# Patient Record
Sex: FEMALE | Race: WHITE | Employment: FULL TIME | URBAN - METROPOLITAN AREA
[De-identification: names, ages, dates, MRNs, and addresses within clinical notes are randomized per-mention and may not be internally consistent; named-entity substitution may affect disease eponyms.]

---

## 2017-05-26 ENCOUNTER — OFFICE VISIT (OUTPATIENT)
Dept: FAMILY MEDICINE CLINIC | Facility: CLINIC | Age: 37
End: 2017-05-26

## 2017-05-26 VITALS
TEMPERATURE: 98 F | WEIGHT: 157 LBS | SYSTOLIC BLOOD PRESSURE: 102 MMHG | HEART RATE: 86 BPM | DIASTOLIC BLOOD PRESSURE: 72 MMHG | RESPIRATION RATE: 16 BRPM | BODY MASS INDEX: 24.64 KG/M2 | HEIGHT: 67 IN

## 2017-05-26 DIAGNOSIS — Z15.01 BRCA2 POSITIVE: ICD-10-CM

## 2017-05-26 DIAGNOSIS — Z15.09 BRCA2 POSITIVE: ICD-10-CM

## 2017-05-26 DIAGNOSIS — Z13.89 SCREENING FOR GENITOURINARY CONDITION: ICD-10-CM

## 2017-05-26 DIAGNOSIS — Z00.00 PHYSICAL EXAM, ANNUAL: Primary | ICD-10-CM

## 2017-05-26 DIAGNOSIS — E55.9 VITAMIN D DEFICIENCY: ICD-10-CM

## 2017-05-26 DIAGNOSIS — Z01.419 CERVICAL SMEAR, AS PART OF ROUTINE GYNECOLOGICAL EXAMINATION: ICD-10-CM

## 2017-05-26 DIAGNOSIS — Z00.00 LABORATORY EXAMINATION ORDERED AS PART OF A ROUTINE GENERAL MEDICAL EXAMINATION: ICD-10-CM

## 2017-05-26 PROCEDURE — 99395 PREV VISIT EST AGE 18-39: CPT | Performed by: FAMILY MEDICINE

## 2017-05-26 NOTE — PATIENT INSTRUCTIONS
Call [phone] to schedule US thyroid and Mammogram.  Schedule appointment with Dr. Partida Nurse for breast clinic. Schedule appointment with Dr. Desi Scales OB/GYN for your Pap smear and further evaluation recommendations as needed.   Return for fasting bloo

## 2017-05-26 NOTE — PROGRESS NOTES
HPI:   Loyda Kumar is a 43year old female who presents for a complete physical exam. Symptoms: denies discharge, itching, burning or dysuria. Patient complains of feeling that she got older for the past 1 year. Her energy is lower.   She thinks that Problem Relation Age of Onset   • Breast Cancer Mother 32   • Cancer Mother      breast ca,    • [place] Man Mother    • varicose veins[other] [OTHER] Mother    • Ovarian Cancer Maternal Grandmother 48      of ovarian cancer.    • Ovarian Can tenderness  : patient referred to see GYN  RECTAL deferred.   MUSCULOSKELETAL: back is not tender,FROM of the back  EXTREMITIES: no cyanosis, clubbing or edema  NEURO: Oriented times three,cranial nerves are intact,motor and sensory are grossly intact

## 2017-05-27 ENCOUNTER — LAB ENCOUNTER (OUTPATIENT)
Dept: LAB | Facility: HOSPITAL | Age: 37
End: 2017-05-27
Attending: FAMILY MEDICINE
Payer: COMMERCIAL

## 2017-05-27 DIAGNOSIS — Z00.00 LABORATORY EXAMINATION ORDERED AS PART OF A ROUTINE GENERAL MEDICAL EXAMINATION: ICD-10-CM

## 2017-05-27 DIAGNOSIS — E55.9 VITAMIN D DEFICIENCY: ICD-10-CM

## 2017-05-27 DIAGNOSIS — Z13.89 SCREENING FOR GENITOURINARY CONDITION: ICD-10-CM

## 2017-05-27 PROCEDURE — 82306 VITAMIN D 25 HYDROXY: CPT

## 2017-05-27 PROCEDURE — 84443 ASSAY THYROID STIM HORMONE: CPT

## 2017-05-27 PROCEDURE — 81003 URINALYSIS AUTO W/O SCOPE: CPT

## 2017-05-27 PROCEDURE — 36415 COLL VENOUS BLD VENIPUNCTURE: CPT

## 2017-05-27 PROCEDURE — 82670 ASSAY OF TOTAL ESTRADIOL: CPT

## 2017-05-27 PROCEDURE — 80061 LIPID PANEL: CPT

## 2017-05-27 PROCEDURE — 80053 COMPREHEN METABOLIC PANEL: CPT

## 2017-05-27 PROCEDURE — 85025 COMPLETE CBC W/AUTO DIFF WBC: CPT

## 2017-05-27 PROCEDURE — 83001 ASSAY OF GONADOTROPIN (FSH): CPT

## 2017-05-30 DIAGNOSIS — R79.89 LOW VITAMIN D LEVEL: Primary | ICD-10-CM

## 2017-05-30 RX ORDER — ERGOCALCIFEROL 1.25 MG/1
50000 CAPSULE ORAL WEEKLY
Qty: 12 CAPSULE | Refills: 0 | Status: SHIPPED | OUTPATIENT
Start: 2017-05-30 | End: 2017-11-15

## 2017-06-02 ENCOUNTER — HOSPITAL ENCOUNTER (OUTPATIENT)
Dept: ULTRASOUND IMAGING | Facility: HOSPITAL | Age: 37
Discharge: HOME | End: 2017-06-02
Attending: OTOLARYNGOLOGY
Payer: COMMERCIAL

## 2017-06-02 DIAGNOSIS — E04.2 MULTINODULAR GOITER: ICD-10-CM

## 2017-06-02 PROCEDURE — 76536 US EXAM OF HEAD AND NECK: CPT | Performed by: OTOLARYNGOLOGY

## 2017-06-05 NOTE — PROGRESS NOTES
Quick Note:    Spoke to patient regarding results and recommendations, scheduled f/u 7/26, patient needs late appointment.  ______

## 2017-06-05 NOTE — PROGRESS NOTES
Quick Note:    Please inform us thyroid showing no significant changes to multiple thyroid nodules present new left 9mm nodule is present recommend patient to follow up with Dr Lakesha Thomson to further evaluate and discuss  ______

## 2017-06-06 ENCOUNTER — TELEPHONE (OUTPATIENT)
Dept: HEMATOLOGY/ONCOLOGY | Facility: HOSPITAL | Age: 37
End: 2017-06-06

## 2017-06-06 ENCOUNTER — LAB ENCOUNTER (OUTPATIENT)
Dept: DERMATOLOGY CLINIC | Age: 37
End: 2017-06-06
Attending: DERMATOLOGY
Payer: COMMERCIAL

## 2017-06-06 DIAGNOSIS — D22.9 IRRITATED NEVUS: Primary | ICD-10-CM

## 2017-06-06 PROCEDURE — 88305 TISSUE EXAM BY PATHOLOGIST: CPT

## 2017-06-14 DIAGNOSIS — D22.9 COMPOUND NEVUS: Primary | ICD-10-CM

## 2017-06-19 ENCOUNTER — OFFICE VISIT (OUTPATIENT)
Dept: OBGYN CLINIC | Facility: CLINIC | Age: 37
End: 2017-06-19

## 2017-06-19 VITALS
SYSTOLIC BLOOD PRESSURE: 102 MMHG | HEART RATE: 88 BPM | WEIGHT: 152 LBS | DIASTOLIC BLOOD PRESSURE: 58 MMHG | BODY MASS INDEX: 23.86 KG/M2 | HEIGHT: 67 IN

## 2017-06-19 DIAGNOSIS — N76.0 VAGINITIS AND VULVOVAGINITIS: ICD-10-CM

## 2017-06-19 DIAGNOSIS — Z15.09 BRCA2 POSITIVE: ICD-10-CM

## 2017-06-19 DIAGNOSIS — Z01.419 ENCOUNTER FOR WELL WOMAN EXAM WITH ROUTINE GYNECOLOGICAL EXAM: Primary | ICD-10-CM

## 2017-06-19 DIAGNOSIS — Z15.01 BRCA2 POSITIVE: ICD-10-CM

## 2017-06-19 PROCEDURE — 87624 HPV HI-RISK TYP POOLED RSLT: CPT | Performed by: OBSTETRICS & GYNECOLOGY

## 2017-06-19 PROCEDURE — 88175 CYTOPATH C/V AUTO FLUID REDO: CPT | Performed by: OBSTETRICS & GYNECOLOGY

## 2017-06-19 PROCEDURE — 87591 N.GONORRHOEAE DNA AMP PROB: CPT | Performed by: OBSTETRICS & GYNECOLOGY

## 2017-06-19 PROCEDURE — 99385 PREV VISIT NEW AGE 18-39: CPT | Performed by: OBSTETRICS & GYNECOLOGY

## 2017-06-19 PROCEDURE — 87491 CHLMYD TRACH DNA AMP PROBE: CPT | Performed by: OBSTETRICS & GYNECOLOGY

## 2017-06-19 NOTE — PROGRESS NOTES
Nina Joseph is a 42year old female [id number] Patient's last menstrual period was 06/06/2017 (approximate). Patient presents with:  Wellness Visit  . Patient has no complaints.    BRCA 2 +, refusing mastectomy and oophorectomy, had RSO in 2015 due to [facility] cups of coffee a day    Exercise Yes    Comment: 5 d/wk    Seat Belt Yes    Self-Exams Yes     Social History Narrative       FAMILY HISTORY:  Family History   Problem Relation Age of Onset   • Breast Cancer Mother 32   • Cancer Mother      breast ca,    • person and situation.  Appropriate mood and affect    Pelvic Exam:  External Genitalia: normal appearance, hair distribution, and no lesions  Urethral Meatus:  normal in size, location, without lesions and prolapse  Bladder:  No fullness, masses or tenderne

## 2017-06-26 ENCOUNTER — OFFICE VISIT (OUTPATIENT)
Dept: HEMATOLOGY/ONCOLOGY | Facility: HOSPITAL | Age: 37
End: 2017-06-26
Attending: SPECIALIST
Payer: COMMERCIAL

## 2017-06-26 VITALS
DIASTOLIC BLOOD PRESSURE: 76 MMHG | SYSTOLIC BLOOD PRESSURE: 124 MMHG | BODY MASS INDEX: 24 KG/M2 | TEMPERATURE: 99 F | WEIGHT: 155.5 LBS | HEART RATE: 80 BPM | RESPIRATION RATE: 16 BRPM

## 2017-06-26 DIAGNOSIS — Z15.01 BRCA2 GENE MUTATION POSITIVE: Primary | ICD-10-CM

## 2017-06-26 DIAGNOSIS — Z12.39 BREAST CANCER SCREENING, HIGH RISK PATIENT: ICD-10-CM

## 2017-06-26 DIAGNOSIS — Z91.89 AT HIGH RISK FOR BREAST CANCER: ICD-10-CM

## 2017-06-26 DIAGNOSIS — Z91.89 HIGH RISK OF OVARIAN CANCER: ICD-10-CM

## 2017-06-26 DIAGNOSIS — Z15.09 BRCA2 GENE MUTATION POSITIVE: Primary | ICD-10-CM

## 2017-06-26 PROCEDURE — 99244 OFF/OP CNSLTJ NEW/EST MOD 40: CPT | Performed by: SPECIALIST

## 2017-06-26 NOTE — PROGRESS NOTES
Referring Provider: Dr. Mahendra Avalos     Additional Providers: Dr. Omega Dawson    Reason for Referral:  Ms. Isamar Diamond was referred for genetic counseling because she has a BRCA2 mutation and is transferring care due to insurance changes.  Ms. at age 60. She has had genetic testing and also carries the BRCA2 gene mutation, c.7879A>T ([id number]). Ms. Sudhir Gutiérrez mother has one sister; that sister was recently diagnosed with ovarian cancer at age 61.   She also has the BRCA2 gene mutation, c.78 testing. Ms. Jasbir Andrade son and daughter each have a 50% chance of having the BRCA2 mutation. We discussed that men with BRCA2 mutations do not have the same cancer risks as seen in women.    Screening for breast cancer in female BRCA2 mutation britton

## 2017-06-26 NOTE — PROGRESS NOTES
Patient is here today for Genetic Consult with Erik Capps. Patient denies pain. Medication list and medical history were reviewed and updated.     Education Record    Learner:  Patient    Disease / Diagnosis: Genetic Consult - patient is [facility]

## 2017-07-09 NOTE — PROGRESS NOTES
[facility] Report of Consultation      Patient Name: Vero Onofre   YOB: 1980  Medical Record Number: [id number]  Consulting Physician: Harvey Richard M.D. Referring Physician: Vi Acuna M.D.     Date of Consultati tobacco use but quit 1998; rare alcohol use. Current Medications   No outpatient prescriptions have been marked as taking for the 6/26/17 encounter (Office Visit) with Brandin Dang MD.     Allergies   Ms. Silver has No Known Allergies. cancer, and up to a 7% lifetime risk of male breast cancer. The risk of melanoma may also be increased.       Ms. Silver is not planning additional children.   Other at risk family members are aware of the mutation and/or have undergone genetic testing salpingo-oophorectomy, transvaginal ultrasound without or without serum CA-125 beginning at age 32-31 or 5-10 years before the earliest age of first diagnosis of ovarian cancer in the family can be considered.  However, data have not shown these screening m

## 2017-07-14 ENCOUNTER — HOSPITAL ENCOUNTER (OUTPATIENT)
Dept: ULTRASOUND IMAGING | Facility: HOSPITAL | Age: 37
Discharge: HOME | End: 2017-07-14
Attending: OBSTETRICS & GYNECOLOGY
Payer: COMMERCIAL

## 2017-07-14 ENCOUNTER — HOSPITAL ENCOUNTER (OUTPATIENT)
Dept: MAMMOGRAPHY | Facility: HOSPITAL | Age: 37
Discharge: HOME | End: 2017-07-14
Attending: OBSTETRICS & GYNECOLOGY
Payer: COMMERCIAL

## 2017-07-14 DIAGNOSIS — Z15.01 BRCA2 POSITIVE: ICD-10-CM

## 2017-07-14 DIAGNOSIS — Z15.09 BRCA2 POSITIVE: ICD-10-CM

## 2017-07-14 PROCEDURE — 77066 DX MAMMO INCL CAD BI: CPT | Performed by: OBSTETRICS & GYNECOLOGY

## 2017-07-14 PROCEDURE — 76830 TRANSVAGINAL US NON-OB: CPT | Performed by: OBSTETRICS & GYNECOLOGY

## 2017-07-14 PROCEDURE — 77062 BREAST TOMOSYNTHESIS BI: CPT | Performed by: OBSTETRICS & GYNECOLOGY

## 2017-07-18 ENCOUNTER — LAB ENCOUNTER (OUTPATIENT)
Dept: LAB | Facility: HOSPITAL | Age: 37
End: 2017-07-18
Attending: DERMATOLOGY
Payer: COMMERCIAL

## 2017-07-18 DIAGNOSIS — D23.9 DERMATOFIBROMA: Primary | ICD-10-CM

## 2017-07-18 PROCEDURE — 88305 TISSUE EXAM BY PATHOLOGIST: CPT

## 2017-07-19 DIAGNOSIS — N83.209 OVARIAN CYST: Primary | ICD-10-CM

## 2017-10-27 ENCOUNTER — HOSPITAL ENCOUNTER (OUTPATIENT)
Dept: ULTRASOUND IMAGING | Facility: HOSPITAL | Age: 37
Discharge: HOME | End: 2017-10-27
Attending: OBSTETRICS & GYNECOLOGY
Payer: COMMERCIAL

## 2017-10-27 DIAGNOSIS — N83.209 OVARIAN CYST: ICD-10-CM

## 2017-10-27 PROCEDURE — 76856 US EXAM PELVIC COMPLETE: CPT | Performed by: OBSTETRICS & GYNECOLOGY

## 2017-10-27 PROCEDURE — 76830 TRANSVAGINAL US NON-OB: CPT | Performed by: OBSTETRICS & GYNECOLOGY

## 2017-10-30 DIAGNOSIS — D25.9 UTERINE LEIOMYOMA, UNSPECIFIED LOCATION: ICD-10-CM

## 2017-10-30 DIAGNOSIS — N83.299 COMPLEX OVARIAN CYST: Primary | ICD-10-CM

## 2017-11-14 ENCOUNTER — LAB ENCOUNTER (OUTPATIENT)
Dept: LAB | Facility: HOSPITAL | Age: 37
End: 2017-11-14
Attending: OBSTETRICS & GYNECOLOGY
Payer: COMMERCIAL

## 2017-11-14 DIAGNOSIS — N83.299 COMPLEX OVARIAN CYST: ICD-10-CM

## 2017-11-14 DIAGNOSIS — R79.89 LOW VITAMIN D LEVEL: ICD-10-CM

## 2017-11-14 PROCEDURE — 86304 IMMUNOASSAY TUMOR CA 125: CPT

## 2017-11-14 PROCEDURE — 36415 COLL VENOUS BLD VENIPUNCTURE: CPT

## 2017-11-14 PROCEDURE — 82306 VITAMIN D 25 HYDROXY: CPT

## 2017-11-15 DIAGNOSIS — E55.9 VITAMIN D DEFICIENCY: Primary | ICD-10-CM

## 2017-11-15 RX ORDER — ERGOCALCIFEROL 1.25 MG/1
CAPSULE ORAL
Qty: 12 CAPSULE | Refills: 0 | Status: SHIPPED | OUTPATIENT
Start: 2017-11-15 | End: 2018-07-06 | Stop reason: ALTCHOICE

## 2018-01-12 ENCOUNTER — HOSPITAL ENCOUNTER (OUTPATIENT)
Dept: ULTRASOUND IMAGING | Facility: HOSPITAL | Age: 38
Discharge: HOME | End: 2018-01-12
Attending: OBSTETRICS & GYNECOLOGY
Payer: COMMERCIAL

## 2018-01-12 DIAGNOSIS — N83.299 COMPLEX OVARIAN CYST: ICD-10-CM

## 2018-01-12 PROCEDURE — 76830 TRANSVAGINAL US NON-OB: CPT | Performed by: OBSTETRICS & GYNECOLOGY

## 2018-01-12 PROCEDURE — 93975 VASCULAR STUDY: CPT | Performed by: OBSTETRICS & GYNECOLOGY

## 2018-01-12 PROCEDURE — 76856 US EXAM PELVIC COMPLETE: CPT | Performed by: OBSTETRICS & GYNECOLOGY

## 2018-01-24 ENCOUNTER — HOSPITAL ENCOUNTER (OUTPATIENT)
Dept: MRI IMAGING | Facility: HOSPITAL | Age: 38
Discharge: HOME | End: 2018-01-24
Attending: SPECIALIST
Payer: COMMERCIAL

## 2018-01-24 DIAGNOSIS — Z15.01 BRCA2 GENE MUTATION POSITIVE: ICD-10-CM

## 2018-01-24 DIAGNOSIS — Z12.39 BREAST CANCER SCREENING, HIGH RISK PATIENT: ICD-10-CM

## 2018-01-24 DIAGNOSIS — Z91.89 AT HIGH RISK FOR BREAST CANCER: ICD-10-CM

## 2018-01-24 DIAGNOSIS — Z91.89 HIGH RISK OF OVARIAN CANCER: ICD-10-CM

## 2018-01-24 DIAGNOSIS — Z15.09 BRCA2 GENE MUTATION POSITIVE: ICD-10-CM

## 2018-01-24 PROCEDURE — 77059 MRI BREAST (W+WO) W/CAD BILAT (CPT=77059/0159T): CPT | Performed by: SPECIALIST

## 2018-01-24 PROCEDURE — 0159T MRI BREAST (W+WO) W/CAD BILAT (CPT=77059/0159T): CPT | Performed by: SPECIALIST

## 2018-01-24 PROCEDURE — A9575 INJ GADOTERATE MEGLUMI 0.1ML: HCPCS | Performed by: SPECIALIST

## 2018-01-26 ENCOUNTER — OFFICE VISIT (OUTPATIENT)
Dept: HEMATOLOGY/ONCOLOGY | Facility: HOSPITAL | Age: 38
End: 2018-01-26
Attending: SPECIALIST
Payer: COMMERCIAL

## 2018-01-26 VITALS
WEIGHT: 153 LBS | OXYGEN SATURATION: 98 % | BODY MASS INDEX: 24.01 KG/M2 | SYSTOLIC BLOOD PRESSURE: 117 MMHG | TEMPERATURE: 98 F | HEART RATE: 91 BPM | HEIGHT: 67 IN | RESPIRATION RATE: 18 BRPM | DIASTOLIC BLOOD PRESSURE: 75 MMHG

## 2018-01-26 DIAGNOSIS — Z91.89 HIGH RISK OF OVARIAN CANCER: ICD-10-CM

## 2018-01-26 DIAGNOSIS — Z12.39 BREAST CANCER SCREENING, HIGH RISK PATIENT: ICD-10-CM

## 2018-01-26 DIAGNOSIS — Z91.89 AT HIGH RISK FOR BREAST CANCER: ICD-10-CM

## 2018-01-26 DIAGNOSIS — Z15.09 BRCA2 POSITIVE: Primary | ICD-10-CM

## 2018-01-26 DIAGNOSIS — Z15.01 BRCA2 GENE MUTATION POSITIVE: ICD-10-CM

## 2018-01-26 DIAGNOSIS — Z15.09 BRCA2 GENE MUTATION POSITIVE: ICD-10-CM

## 2018-01-26 DIAGNOSIS — Z15.01 BRCA2 POSITIVE: Primary | ICD-10-CM

## 2018-01-26 DIAGNOSIS — R92.8 ABNORMAL MRI, BREAST: ICD-10-CM

## 2018-01-26 PROCEDURE — 99214 OFFICE O/P EST MOD 30 MIN: CPT | Performed by: SPECIALIST

## 2018-01-26 NOTE — PROGRESS NOTES
Patient is here today for follow up with Dr. Mayra Ngo. Patient denies pain. Medication list and medical history were reviewed and updated.     Education Record    Learner:  Patient    Disease / Diagnosis: BRCA2    Barriers / Limitations:  None   Co

## 2018-01-30 NOTE — PROGRESS NOTES
[facility] Progress Note      Patient Name: Floyd Hinojosa   YOB: 1980  Medical Record Number: [id number]  Attending Physician: Catherine Jarquin M.D.      Date of Visit: 1/26/2018       Chief Complaint  Deleterious BRCA2 mutation Ms. Stacey Vazquez is not in contact with her father. Her paternal grandmother has late onset lung cancer. There is no known family history of pancreatic cancer or melanoma. Please see the pedigree for additional family history information.     Social Hist non-distended; no masses,no fluid wave; normoactive bowel sounds; no hepatosplenomegaly. Extremities          No lower extremity edema. Integumentary      Skin is warm and dry.   Neurologic           Alert and oriented x 3; motor and sensory grossly intac provider every 6 months. 2. Annual mammography and annual MRI are advised such that breast imaging is performed every 6 months. After age 76, management should be considered on an individual basis. 3. Consider risk-reducing prophylactic mastectomy.   4. Electronically signed by:    Renato Turcios M.D.   [facility]  Director, Bone and Soft Tissue Sarcoma Program  Section of Hematology/Oncology, [address] [facility]

## 2018-02-16 ENCOUNTER — TELEPHONE (OUTPATIENT)
Dept: HEMATOLOGY/ONCOLOGY | Facility: HOSPITAL | Age: 38
End: 2018-02-16

## 2018-02-16 ENCOUNTER — HOSPITAL ENCOUNTER (OUTPATIENT)
Dept: MAMMOGRAPHY | Facility: HOSPITAL | Age: 38
Discharge: HOME | End: 2018-02-16
Attending: SPECIALIST
Payer: COMMERCIAL

## 2018-02-16 DIAGNOSIS — Z15.01 BRCA2 POSITIVE: ICD-10-CM

## 2018-02-16 DIAGNOSIS — Z15.09 BRCA2 POSITIVE: ICD-10-CM

## 2018-02-16 DIAGNOSIS — R92.8 ABNORMAL MRI, BREAST: ICD-10-CM

## 2018-02-16 DIAGNOSIS — Z91.89 AT HIGH RISK FOR BREAST CANCER: ICD-10-CM

## 2018-02-16 DIAGNOSIS — N63.10 BREAST MASS, RIGHT: Primary | ICD-10-CM

## 2018-02-16 DIAGNOSIS — Z12.39 BREAST CANCER SCREENING, HIGH RISK PATIENT: ICD-10-CM

## 2018-02-16 PROCEDURE — 76642 ULTRASOUND BREAST LIMITED: CPT | Performed by: SPECIALIST

## 2018-02-21 ENCOUNTER — TELEPHONE (OUTPATIENT)
Dept: MAMMOGRAPHY | Facility: HOSPITAL | Age: 38
End: 2018-02-21

## 2018-02-21 NOTE — TELEPHONE ENCOUNTER
Spoke with pt about breast biopsy recommendation. She has had \"3 done in the past\". She did not have any questions about the procedure.   Blood thinners reviewed with [place] along with what to do on the day of the biopsy.(take Tylenol, bring sports bra, ea

## 2018-02-28 ENCOUNTER — HOSPITAL ENCOUNTER (OUTPATIENT)
Dept: MAMMOGRAPHY | Facility: HOSPITAL | Age: 38
Discharge: HOME | End: 2018-02-28
Attending: CLINICAL NURSE SPECIALIST
Payer: COMMERCIAL

## 2018-02-28 DIAGNOSIS — N63.10 BREAST MASS, RIGHT: ICD-10-CM

## 2018-02-28 PROCEDURE — 77065 DX MAMMO INCL CAD UNI: CPT | Performed by: CLINICAL NURSE SPECIALIST

## 2018-02-28 PROCEDURE — 19083 BX BREAST 1ST LESION US IMAG: CPT | Performed by: CLINICAL NURSE SPECIALIST

## 2018-02-28 PROCEDURE — 88305 TISSUE EXAM BY PATHOLOGIST: CPT | Performed by: CLINICAL NURSE SPECIALIST

## 2018-03-05 ENCOUNTER — TELEPHONE (OUTPATIENT)
Dept: HEMATOLOGY/ONCOLOGY | Facility: HOSPITAL | Age: 38
End: 2018-03-05

## 2018-03-06 ENCOUNTER — TELEPHONE (OUTPATIENT)
Dept: MAMMOGRAPHY | Facility: HOSPITAL | Age: 38
End: 2018-03-06

## 2018-03-06 NOTE — TELEPHONE ENCOUNTER
Telephoned [employer] and name,  verified with pt. Notified [employer] of benign right US breast biopsy result. [employer] reports biopsy site is healing well. Hematoma management discussed.  Radiologist recommends next mammogram is du

## 2018-05-09 ENCOUNTER — TELEPHONE (OUTPATIENT)
Dept: FAMILY MEDICINE CLINIC | Facility: CLINIC | Age: 38
End: 2018-05-09

## 2018-05-09 DIAGNOSIS — D22.9 COMPOUND NEVUS: Primary | ICD-10-CM

## 2018-05-09 NOTE — TELEPHONE ENCOUNTER
Referral for Dermatology (INTERNAL)  1980    . Reason for the order/referral:Referral   PCP: Dr. Marcelo Rodriguez   Refer to Provider Dr. Keegan Cutler   Specialty: Dermatology   Patient Insurance: Payor: Juliet Lee / Plan: Cecy Kirby / Guy Cordoba

## 2018-06-27 ENCOUNTER — TELEPHONE (OUTPATIENT)
Dept: FAMILY MEDICINE CLINIC | Facility: CLINIC | Age: 38
End: 2018-06-27

## 2018-06-27 DIAGNOSIS — E04.2 NONTOXIC MULTINODULAR GOITER: Primary | ICD-10-CM

## 2018-06-27 NOTE — TELEPHONE ENCOUNTER
REFERRAL FOR ENT Dr. Idris Cao (INTERNAL)  1980    Last seen 2017     Reason for the order/referral:Thyroid follow up   PCP: Eliane Colon   Refer to Provider: Idris Cao   Specialty: ENT   Patient Insurance: Payor: Jeimy Pritchett / Plan: Paramjit Lai

## 2018-07-06 ENCOUNTER — OFFICE VISIT (OUTPATIENT)
Dept: FAMILY MEDICINE CLINIC | Facility: CLINIC | Age: 38
End: 2018-07-06

## 2018-07-06 VITALS
HEIGHT: 67 IN | SYSTOLIC BLOOD PRESSURE: 104 MMHG | OXYGEN SATURATION: 98 % | WEIGHT: 161.25 LBS | HEART RATE: 98 BPM | BODY MASS INDEX: 25.31 KG/M2 | DIASTOLIC BLOOD PRESSURE: 62 MMHG

## 2018-07-06 DIAGNOSIS — E04.2 MULTIPLE THYROID NODULES: ICD-10-CM

## 2018-07-06 DIAGNOSIS — R53.83 FATIGUE, UNSPECIFIED TYPE: ICD-10-CM

## 2018-07-06 DIAGNOSIS — E55.9 VITAMIN D DEFICIENCY: ICD-10-CM

## 2018-07-06 DIAGNOSIS — Z00.00 PHYSICAL EXAM, ANNUAL: Primary | ICD-10-CM

## 2018-07-06 PROCEDURE — 99396 PREV VISIT EST AGE 40-64: CPT | Performed by: FAMILY MEDICINE

## 2018-07-06 NOTE — PROGRESS NOTES
HPI:   Nina Joseph is a 34year old female who presents for a complete physical exam. Symptoms: denies discharge, itching, burning or dysuria. Her energy is lower. She thinks that this is related to her hormones. She is only one ovary left.   Stat Disp:  Rfl:    cholecalciferol 1000 units Oral Cap Take one capsule by mouth once a day on all days not taking prescription strength vitamin d.  Disp:  Rfl: 0      Past Medical History:   Diagnosis Date   • BRCA2 positive 2014   • Breast tumor right    11/2 discharge or itching,  MUSCULOSKELETAL: denies back pain  NEURO: denies headaches  PSYCHE: denies depression or anxiety  HEMATOLOGIC: denies hx of anemia  ENDOCRINE: denies thyroid history  ALL/ASTHMA: denies hx of allergy or asthma    EXAM:   /62 (B CMP, and CBC. Pt will be at 46 referred for screening colonoscopy. Pt' s weight is Body mass index is 25.26 kg/m². , recommended low carb diet and aerobic exercise 30 minutes three times weekly.   The patient indicates understanding of these issues and agree

## 2018-07-12 ENCOUNTER — HOSPITAL ENCOUNTER (OUTPATIENT)
Dept: MAMMOGRAPHY | Facility: HOSPITAL | Age: 38
Discharge: HOME | End: 2018-07-12
Attending: SPECIALIST
Payer: COMMERCIAL

## 2018-07-12 ENCOUNTER — LAB ENCOUNTER (OUTPATIENT)
Dept: LAB | Facility: HOSPITAL | Age: 38
End: 2018-07-12
Attending: FAMILY MEDICINE
Payer: COMMERCIAL

## 2018-07-12 ENCOUNTER — HOSPITAL ENCOUNTER (OUTPATIENT)
Dept: ULTRASOUND IMAGING | Facility: HOSPITAL | Age: 38
Discharge: HOME | End: 2018-07-12
Attending: FAMILY MEDICINE
Payer: COMMERCIAL

## 2018-07-12 DIAGNOSIS — Z15.09 BRCA2 POSITIVE: ICD-10-CM

## 2018-07-12 DIAGNOSIS — E55.9 VITAMIN D DEFICIENCY: Primary | ICD-10-CM

## 2018-07-12 DIAGNOSIS — Z12.39 BREAST CANCER SCREENING, HIGH RISK PATIENT: ICD-10-CM

## 2018-07-12 DIAGNOSIS — E04.2 MULTIPLE THYROID NODULES: ICD-10-CM

## 2018-07-12 DIAGNOSIS — E55.9 VITAMIN D DEFICIENCY: ICD-10-CM

## 2018-07-12 DIAGNOSIS — Z00.00 PHYSICAL EXAM, ANNUAL: ICD-10-CM

## 2018-07-12 DIAGNOSIS — Z91.89 AT HIGH RISK FOR BREAST CANCER: ICD-10-CM

## 2018-07-12 DIAGNOSIS — R92.8 ABNORMAL MRI, BREAST: ICD-10-CM

## 2018-07-12 DIAGNOSIS — Z15.01 BRCA2 POSITIVE: ICD-10-CM

## 2018-07-12 DIAGNOSIS — R53.83 FATIGUE, UNSPECIFIED TYPE: ICD-10-CM

## 2018-07-12 LAB
25-HYDROXYVITAMIN D (TOTAL): 22.2 NG/ML (ref 30–100)
ALBUMIN SERPL-MCNC: 3.5 G/DL (ref 3.5–4.8)
ALP LIVER SERPL-CCNC: 42 U/L (ref 37–98)
ALT SERPL-CCNC: 26 U/L (ref 14–54)
AST SERPL-CCNC: 14 U/L (ref 15–41)
BASOPHILS # BLD AUTO: 0.03 X10(3) UL (ref 0–0.1)
BASOPHILS NFR BLD AUTO: 0.5 %
BILIRUB SERPL-MCNC: 0.6 MG/DL (ref 0.1–2)
BILIRUB UR QL STRIP.AUTO: NEGATIVE
BUN BLD-MCNC: 8 MG/DL (ref 8–20)
CALCIUM BLD-MCNC: 8.7 MG/DL (ref 8.3–10.3)
CHLORIDE: 108 MMOL/L (ref 101–111)
CHOLEST SMN-MCNC: 156 MG/DL (ref ?–200)
CO2: 27 MMOL/L (ref 22–32)
COLOR UR AUTO: YELLOW
CREAT BLD-MCNC: 0.68 MG/DL (ref 0.55–1.02)
EOSINOPHIL # BLD AUTO: 0.2 X10(3) UL (ref 0–0.3)
EOSINOPHIL NFR BLD AUTO: 3.4 %
ERYTHROCYTE [DISTWIDTH] IN BLOOD BY AUTOMATED COUNT: 13.2 % (ref 11.5–16)
FSH: 14.2 MIU/ML
GLUCOSE BLD-MCNC: 88 MG/DL (ref 70–99)
GLUCOSE UR STRIP.AUTO-MCNC: NEGATIVE MG/DL
HAV AB SERPL IA-ACNC: 374 PG/ML (ref 193–986)
HCT VFR BLD AUTO: 40.1 % (ref 34–50)
HDLC SERPL-MCNC: 62 MG/DL (ref 45–?)
HDLC SERPL: 2.52 {RATIO} (ref ?–4.44)
HGB BLD-MCNC: 13.2 G/DL (ref 12–16)
IMMATURE GRANULOCYTE COUNT: 0.01 X10(3) UL (ref 0–1)
IMMATURE GRANULOCYTE RATIO %: 0.2 %
KETONES UR STRIP.AUTO-MCNC: NEGATIVE MG/DL
LDLC SERPL CALC-MCNC: 83 MG/DL (ref ?–130)
LYMPHOCYTES # BLD AUTO: 2.27 X10(3) UL (ref 0.9–4)
LYMPHOCYTES NFR BLD AUTO: 38.2 %
M PROTEIN MFR SERPL ELPH: 6.9 G/DL (ref 6.1–8.3)
MCH RBC QN AUTO: 28 PG (ref 27–33.2)
MCHC RBC AUTO-ENTMCNC: 32.9 G/DL (ref 31–37)
MCV RBC AUTO: 85.1 FL (ref 81–100)
MONOCYTES # BLD AUTO: 0.43 X10(3) UL (ref 0.1–1)
MONOCYTES NFR BLD AUTO: 7.2 %
NEUTROPHIL ABS PRELIM: 3 X10 (3) UL (ref 1.3–6.7)
NEUTROPHILS # BLD AUTO: 3 X10(3) UL (ref 1.3–6.7)
NEUTROPHILS NFR BLD AUTO: 50.5 %
NITRITE UR QL STRIP.AUTO: NEGATIVE
NONHDLC SERPL-MCNC: 94 MG/DL (ref ?–130)
PH UR STRIP.AUTO: 5 [PH] (ref 4.5–8)
PLATELET # BLD AUTO: 187 10(3)UL (ref 150–450)
POTASSIUM SERPL-SCNC: 3.6 MMOL/L (ref 3.6–5.1)
PROT UR STRIP.AUTO-MCNC: NEGATIVE MG/DL
RBC # BLD AUTO: 4.71 X10(6)UL (ref 3.8–5.1)
RED CELL DISTRIBUTION WIDTH-SD: 41.3 FL (ref 35.1–46.3)
SODIUM SERPL-SCNC: 142 MMOL/L (ref 136–144)
SP GR UR STRIP.AUTO: 1.02 (ref 1–1.03)
TRIGL SERPL-MCNC: 54 MG/DL (ref ?–150)
TSI SER-ACNC: 1.94 MIU/ML (ref 0.35–5.5)
UROBILINOGEN UR STRIP.AUTO-MCNC: <2 MG/DL
VLDLC SERPL CALC-MCNC: 11 MG/DL (ref 5–40)
WBC # BLD AUTO: 5.9 X10(3) UL (ref 4–13)

## 2018-07-12 PROCEDURE — 85025 COMPLETE CBC W/AUTO DIFF WBC: CPT

## 2018-07-12 PROCEDURE — 82306 VITAMIN D 25 HYDROXY: CPT

## 2018-07-12 PROCEDURE — 80061 LIPID PANEL: CPT

## 2018-07-12 PROCEDURE — 83001 ASSAY OF GONADOTROPIN (FSH): CPT

## 2018-07-12 PROCEDURE — 84443 ASSAY THYROID STIM HORMONE: CPT

## 2018-07-12 PROCEDURE — 88305 TISSUE EXAM BY PATHOLOGIST: CPT | Performed by: DERMATOLOGY

## 2018-07-12 PROCEDURE — 36415 COLL VENOUS BLD VENIPUNCTURE: CPT

## 2018-07-12 PROCEDURE — 81001 URINALYSIS AUTO W/SCOPE: CPT

## 2018-07-12 PROCEDURE — 80053 COMPREHEN METABOLIC PANEL: CPT

## 2018-07-12 PROCEDURE — 82607 VITAMIN B-12: CPT

## 2018-07-12 PROCEDURE — 77062 BREAST TOMOSYNTHESIS BI: CPT | Performed by: SPECIALIST

## 2018-07-12 PROCEDURE — 76536 US EXAM OF HEAD AND NECK: CPT | Performed by: FAMILY MEDICINE

## 2018-07-12 PROCEDURE — 77066 DX MAMMO INCL CAD BI: CPT | Performed by: SPECIALIST

## 2018-07-12 RX ORDER — ERGOCALCIFEROL 1.25 MG/1
50000 CAPSULE ORAL
Qty: 12 CAPSULE | Refills: 0 | Status: SHIPPED | OUTPATIENT
Start: 2018-07-12 | End: 2018-08-11

## 2018-07-13 ENCOUNTER — LAB REQUISITION (OUTPATIENT)
Dept: LAB | Facility: HOSPITAL | Age: 38
End: 2018-07-13
Payer: COMMERCIAL

## 2018-07-13 DIAGNOSIS — Z01.818 ENCOUNTER FOR OTHER PREPROCEDURAL EXAMINATION: ICD-10-CM

## 2018-07-20 ENCOUNTER — OFFICE VISIT (OUTPATIENT)
Dept: OBGYN CLINIC | Facility: CLINIC | Age: 38
End: 2018-07-20

## 2018-07-20 VITALS
HEART RATE: 64 BPM | DIASTOLIC BLOOD PRESSURE: 82 MMHG | SYSTOLIC BLOOD PRESSURE: 122 MMHG | RESPIRATION RATE: 16 BRPM | BODY MASS INDEX: 25.74 KG/M2 | HEIGHT: 67 IN | WEIGHT: 164 LBS

## 2018-07-20 DIAGNOSIS — Z15.09 BRCA2 POSITIVE: ICD-10-CM

## 2018-07-20 DIAGNOSIS — Z12.4 CERVICAL CANCER SCREENING: ICD-10-CM

## 2018-07-20 DIAGNOSIS — R39.9 UTI SYMPTOMS: Primary | ICD-10-CM

## 2018-07-20 DIAGNOSIS — Z01.419 ENCOUNTER FOR WELL WOMAN EXAM WITH ROUTINE GYNECOLOGICAL EXAM: ICD-10-CM

## 2018-07-20 DIAGNOSIS — Z15.01 BRCA2 POSITIVE: ICD-10-CM

## 2018-07-20 PROBLEM — D06.9 CARCINOMA IN SITU OF CERVIX: Status: ACTIVE | Noted: 2018-07-20

## 2018-07-20 LAB
APPEARANCE: CLEAR
BILIRUBIN: NEGATIVE
GLUCOSE (URINE DIPSTICK): NEGATIVE MG/DL
LEUKOCYTES: NEGATIVE
MULTISTIX LOT#: NORMAL NUMERIC
NITRITE, URINE: NEGATIVE
OCCULT BLOOD: NEGATIVE
PH, URINE: 7 (ref 4.5–8)
PROTEIN (URINE DIPSTICK): NEGATIVE MG/DL
SPECIFIC GRAVITY: 1.02 (ref 1–1.03)
URINE-COLOR: YELLOW
UROBILINOGEN,SEMI-QN: 1 MG/DL (ref 0–1.9)

## 2018-07-20 PROCEDURE — 87086 URINE CULTURE/COLONY COUNT: CPT | Performed by: OBSTETRICS & GYNECOLOGY

## 2018-07-20 PROCEDURE — 88175 CYTOPATH C/V AUTO FLUID REDO: CPT | Performed by: OBSTETRICS & GYNECOLOGY

## 2018-07-20 PROCEDURE — 87624 HPV HI-RISK TYP POOLED RSLT: CPT | Performed by: OBSTETRICS & GYNECOLOGY

## 2018-07-20 PROCEDURE — 99396 PREV VISIT EST AGE 40-64: CPT | Performed by: OBSTETRICS & GYNECOLOGY

## 2018-07-20 PROCEDURE — 81003 URINALYSIS AUTO W/O SCOPE: CPT | Performed by: OBSTETRICS & GYNECOLOGY

## 2018-07-20 NOTE — PROGRESS NOTES
Papo Ruiz is a 34year old female [id number] Patient's last menstrual period was 07/02/2018 (approximate). Patient presents with:  Wellness Visit: annual  .  Patient states that her periods are getting heavier with clots.  Also c/o frequent urination  B Caffeine Concern No    Comment: 2 cups of coffee a day    Exercise Yes    Comment: 5 d/wk    Seat Belt Yes    Self-Exams Yes     Social History Narrative   None on file       FAMILY HISTORY:  Family History   Problem Relation Age of Onset   • Breast Cancer enlarged, irregular, patient does thyroid u/s to follow up  Lymphatic:no abnormal supraclavicular or axillary adenopathy is noted  Breast: normal without palpable masses, tenderness, asymmetry, nipple discharge, nipple retraction or skin changes  Abdomen:

## 2018-07-22 LAB — HPV I/H RISK 1 DNA SPEC QL NAA+PROBE: NEGATIVE

## 2018-07-26 PROCEDURE — 88305 TISSUE EXAM BY PATHOLOGIST: CPT | Performed by: DERMATOLOGY

## 2018-07-27 ENCOUNTER — LAB REQUISITION (OUTPATIENT)
Dept: LAB | Facility: HOSPITAL | Age: 38
End: 2018-07-27
Payer: COMMERCIAL

## 2018-07-27 DIAGNOSIS — D48.5 NEOPLASM OF UNCERTAIN BEHAVIOR OF SKIN: ICD-10-CM

## 2018-08-08 ENCOUNTER — HOSPITAL ENCOUNTER (OUTPATIENT)
Dept: ULTRASOUND IMAGING | Facility: HOSPITAL | Age: 38
Discharge: HOME | End: 2018-08-08
Attending: OBSTETRICS & GYNECOLOGY
Payer: COMMERCIAL

## 2018-08-08 DIAGNOSIS — Z15.09 BRCA2 POSITIVE: ICD-10-CM

## 2018-08-08 DIAGNOSIS — Z15.01 BRCA2 POSITIVE: ICD-10-CM

## 2018-08-08 PROCEDURE — 76856 US EXAM PELVIC COMPLETE: CPT | Performed by: OBSTETRICS & GYNECOLOGY

## 2018-08-08 PROCEDURE — 76830 TRANSVAGINAL US NON-OB: CPT | Performed by: OBSTETRICS & GYNECOLOGY

## 2018-08-23 ENCOUNTER — LAB REQUISITION (OUTPATIENT)
Dept: LAB | Facility: HOSPITAL | Age: 38
End: 2018-08-23
Payer: COMMERCIAL

## 2018-08-23 DIAGNOSIS — Z01.818 ENCOUNTER FOR OTHER PREPROCEDURAL EXAMINATION: ICD-10-CM

## 2018-08-23 PROCEDURE — 88305 TISSUE EXAM BY PATHOLOGIST: CPT | Performed by: DERMATOLOGY

## 2018-12-17 ENCOUNTER — OFFICE VISIT (OUTPATIENT)
Dept: FAMILY MEDICINE CLINIC | Facility: CLINIC | Age: 38
End: 2018-12-17

## 2018-12-17 VITALS
TEMPERATURE: 98 F | SYSTOLIC BLOOD PRESSURE: 108 MMHG | HEIGHT: 67 IN | WEIGHT: 166 LBS | HEART RATE: 91 BPM | RESPIRATION RATE: 16 BRPM | BODY MASS INDEX: 26.06 KG/M2 | OXYGEN SATURATION: 97 % | DIASTOLIC BLOOD PRESSURE: 58 MMHG

## 2018-12-17 DIAGNOSIS — J01.00 ACUTE NON-RECURRENT MAXILLARY SINUSITIS: ICD-10-CM

## 2018-12-17 DIAGNOSIS — J02.9 SORE THROAT: Primary | ICD-10-CM

## 2018-12-17 DIAGNOSIS — R05.9 COUGH: ICD-10-CM

## 2018-12-17 PROCEDURE — 99214 OFFICE O/P EST MOD 30 MIN: CPT | Performed by: FAMILY MEDICINE

## 2018-12-17 PROCEDURE — 87880 STREP A ASSAY W/OPTIC: CPT | Performed by: FAMILY MEDICINE

## 2018-12-17 PROCEDURE — 87081 CULTURE SCREEN ONLY: CPT | Performed by: FAMILY MEDICINE

## 2018-12-17 RX ORDER — FLUTICASONE PROPIONATE 50 MCG
2 SPRAY, SUSPENSION (ML) NASAL DAILY
Qty: 1 BOTTLE | Refills: 1 | Status: SHIPPED | OUTPATIENT
Start: 2018-12-17 | End: 2019-07-22

## 2018-12-17 RX ORDER — AMOXICILLIN AND CLAVULANATE POTASSIUM 875; 125 MG/1; MG/1
1 TABLET, FILM COATED ORAL 2 TIMES DAILY
Qty: 20 TABLET | Refills: 0 | Status: SHIPPED | OUTPATIENT
Start: 2018-12-17 | End: 2018-12-27

## 2018-12-18 NOTE — PROGRESS NOTES
Amy Bain is a 34year old female. cc sore throat cough, sinus congestion  HPI:   Patient is coming complaining of having symptoms for 10 days. She has a sore throat or trouble swallowing. Her throat feels swollen. Red.   She has also postnasal dri lesions or rashes  HEENT sinus/nasal congestion ,  runny nose,  sore throat  Neck no neck pain  RESPIRATORY: denies shortness of breath with exertion, cough, chest congestion  CARDIOVASCULAR: denies chest pain on exertion  GI: denies abdominal pain and den over-the-counter as needed. Lozenges over-the-counter as needed. Imaging & Consults:  None    The patient indicates understanding of these issues and agrees to the plan. The patient is asked to return in 1 week prn.

## 2018-12-18 NOTE — PATIENT INSTRUCTIONS
Start antibiotic today per directions. Flonase 2 sprays each  nostril once a day. Use decongestants over-the-counter as needed. Take probiotic over-the-counter daily like organic yogurt while taking antibiotic. Drink plenty of fluids.   Take Tylenol or

## 2019-06-12 NOTE — PROGRESS NOTES
Pt informed, order placed. [facility] CARDIOLOGY OFFICE FOLLOW-UP      Patient: Ermelinda Mooney  YOB: 1959  MRN: [id number]    Chief Complaint:  Chief Complaint   Patient presents with    Follow-Up from [facility]         Subjective/HPI:  6/12/19: Follow-up of atrial fibrillation. Had ablation by Dr. Marlena Willams. Recently she was admitted with GI bleeding. Was transferred to [facility]. No further bleeding. She also has a history of craniotomy for meningioma. She will see me in 1 month. Hold anticoagulation at this point     1/9/19: Patient presents today for follow-up of atrial fibrillation. Had ablation by Dr. Marlena Willams. Also had craniotomy for meningioma. Blood pressure remains controlled. See me in 6 months. 9/7/18: Patient presents today for Follow-up of recent craniotomy for meningioma. Surgery was uneventful. Back on Xarelto. She has history of atrial fibrillation and had ablation by Dr. Marlena Willams a year and a half ago. Doing well. No chest chest pain congestive heart failure symptoms or syncope. No ankle edema. No claudication. No headaches nausea vomiting. See me in 6 months.     12/19/2017: Patient presents today for Follow-up of recent hospitalization for atrial fibrillation and chest pain. Stress is negative for ischemia. She had ablation done by Dr. Marlena Willams one year ago. She used to be on flecainide and that  this was later discontinued. We will continue with the other medications except flecainide. See me in 6 months.     11/27/2017: Patient presents today for follow-up of recent hospitalization at [facility] for atrial fibrillation and then chest pain. By the time she arrived ER,AF resolved she has undergone atrial fibrillation ablation by Jaxson gonsales at [facility]. Prior to that she was on flecainide. Flecainide was stopped after the ablation. She denies excessive caffeine abuse she denies smoking. She also had chest discomfort this time. There was some radiation to the jaw.  No nausea no Suzanne Alejandra MD at [address] [place] [address] TOTAL KNEE ARTHROPLASTY  2009    UPPER GASTROINTESTINAL ENDOSCOPY  1/15/13    DR. MEEKS       Family History   Problem Relation Age of Onset    Cancer Father         COLON    High Blood Pressure Father     Heart Disease Father        Social History     Socioeconomic History    Marital status:      Spouse name: None    Number of children: None    Years of education: None    Highest education level: None   Occupational History    None   Social Needs    Financial resource strain: None    Food insecurity:     Worry: None     Inability: None    Transportation needs:     Medical: None     Non-medical: None   Tobacco Use    Smoking status: Never Smoker    Smokeless tobacco: Never Used   Substance and Sexual Activity    Alcohol use: No     Alcohol/week: 0.0 oz    Drug use: No    Sexual activity: Not Currently   Lifestyle    Physical activity:     Days per week: None     Minutes per session: None    Stress: None   Relationships    Social connections:     Talks on phone: None     Gets together: None     Attends Alevism service: None     Active member of club or organization: None     Attends meetings of clubs or organizations: None     Relationship status: None    Intimate partner violence:     Fear of current or ex partner: None     Emotionally abused: None     Physically abused: None     Forced sexual activity: None   Other Topics Concern    None   Social History Narrative    None       Allergies   Allergen Reactions    Codeine     Demerol     Dye [Iodides] Nausea Only    Morphine     Sulfa Antibiotics Hives and Itching    Levofloxacin Itching and Rash       Current Outpatient Medications   Medication Sig Dispense Refill    hyoscyamine (ANASPAZ;LEVSIN) 125 MCG tablet Take 0.125 mg by mouth      ondansetron (ZOFRAN-ODT) 4 MG disintegrating tablet Take 4 mg by mouth      traZODone (DESYREL) 50 MG tablet Take 1 tablet by (1.651 m)   Wt 208 lb (94.3 kg)   LMP  (LMP Unknown)   SpO2 98%   BMI 34.61 kg/m²    Physical Exam   Constitutional: She appears healthy. No distress. HENT:   Nose: Nose normal.   Mouth/Throat: Dentition is normal. Oropharynx is clear. Eyes: Pupils are equal, round, and reactive to light. Conjunctivae are normal.   Neck: Normal range of motion and thyroid normal. Neck supple. Cardiovascular: Regular rhythm, S1 normal, S2 normal, normal heart sounds, intact distal pulses and normal pulses. PMI is not displaced. No murmur heard. Pulmonary/Chest: She has no wheezes. She has no rales. She exhibits no tenderness. Abdominal: Soft. Bowel sounds are normal. She exhibits no distension and no mass. There is no splenomegaly or hepatomegaly. There is no tenderness. No hernia. Neurological: She is alert and oriented to person, place, and time. She has normal motor skills. Gait normal.   Skin: Skin is warm and dry. No cyanosis. No jaundice. Nails show no clubbing.        LABS:  CBC:   Lab Results   Component Value Date    WBC 9.3 06/06/2019    RBC 5.75 06/06/2019    RBC 5.18 03/18/2018    HGB 18.4 06/06/2019    HCT 53.3 06/06/2019    MCV 92.8 06/06/2019    MCH 32.0 06/06/2019    MCHC 34.5 06/06/2019    RDW 13.7 06/06/2019     06/06/2019    MPV 10.8 03/18/2018     Lipids:  Lab Results   Component Value Date    CHOL 168 04/11/2019    CHOL 171 10/01/2018    CHOL 168 10/30/2016     Lab Results   Component Value Date    TRIG 107 04/11/2019    TRIG 116 10/01/2018    TRIG 128 10/30/2016     Lab Results   Component Value Date    HDL 53 04/11/2019    HDL 56 10/01/2018    HDL 55 10/30/2016     Lab Results   Component Value Date    LDLCALC 94 04/11/2019    LDLCALC 92 10/01/2018    LDLCALC 87 10/30/2016     No results found for: LABVLDL, VLDL  Lab Results   Component Value Date    CHOLHDLRATIO 3.3 12/27/2012     CMP:    Lab Results   Component Value Date     06/06/2019    K 4.6 06/06/2019     06/06/2019 CO2 22 06/06/2019    BUN 17 06/06/2019    CREATININE 0.54 06/06/2019    GFRAA >60.0 06/06/2019    AGRATIO 1.4 03/18/2018    LABGLOM >60.0 06/06/2019    GLUCOSE 115 06/06/2019    GLUCOSE 97 03/19/2018    PROT 8.1 06/06/2019    LABALBU 4.9 06/06/2019    LABALBU 3.8 03/18/2018    CALCIUM 10.4 06/06/2019    BILITOT 0.3 06/06/2019    ALKPHOS 144 06/06/2019    AST 20 06/06/2019    ALT 18 06/06/2019     BMP:    Lab Results   Component Value Date     06/06/2019    K 4.6 06/06/2019     06/06/2019    CO2 22 06/06/2019    BUN 17 06/06/2019    LABALBU 4.9 06/06/2019    LABALBU 3.8 03/18/2018    CREATININE 0.54 06/06/2019    CALCIUM 10.4 06/06/2019    GFRAA >60.0 06/06/2019    LABGLOM >60.0 06/06/2019    GLUCOSE 115 06/06/2019    GLUCOSE 97 03/19/2018     Magnesium:    Lab Results   Component Value Date    MG 1.7 03/18/2018     TSH:  Lab Results   Component Value Date    TSH 1.490 04/11/2019       Patient Active Problem List   Diagnosis    Irritable bowel syndrome    Hx of colonic polyps    PAF (paroxysmal atrial fibrillation) (HCC)    Hypertension    Pulmonary vein stenosis    History of open heart surgery    Idiopathic scoliosis of lumbar spine    Degenerative disc disease, lumbar    Lumbar radiculopathy    Brain mass    Other chest pain    Benign meningioma of brain (HCC)    Meningioma ([facility]    Functional diarrhea       Medications Discontinued During This Encounter   Medication Reason    Rivaroxaban (XARELTO PO) Therapy completed    naproxen (NAPROSYN) 375 MG tablet Therapy completed    Na Sulfate-K Sulfate-Mg Sulf 17.5-3.13-1.6 GM/177ML SOLN Therapy completed    cholestyramine (QUESTRAN) 4 g packet Therapy completed       Modified Medications    No medications on file       No orders of the defined types were placed in this encounter. Assessment:    1. PAF (paroxysmal atrial fibrillation) ([facility]    2. Essential hypertension    3.  History of open heart surgery       Plan:   Stay on same medications. See me in 1 month. This note was partially generated using Dragon voice recognition system, and there may be some incorrect words, spellings, punctuation that were not noticed in checking the note before saving.         Electronically signed by Esha Dyer MD on 6/19/2019 at 1:30 PM

## 2019-06-15 ENCOUNTER — APPOINTMENT (OUTPATIENT)
Dept: LAB | Facility: HOSPITAL | Age: 39
End: 2019-06-15
Attending: FAMILY MEDICINE
Payer: COMMERCIAL

## 2019-06-15 ENCOUNTER — APPOINTMENT (OUTPATIENT)
Dept: ULTRASOUND IMAGING | Facility: HOSPITAL | Age: 39
End: 2019-06-15
Attending: OBSTETRICS & GYNECOLOGY
Payer: COMMERCIAL

## 2019-06-15 DIAGNOSIS — E55.9 VITAMIN D DEFICIENCY: ICD-10-CM

## 2019-06-15 PROCEDURE — 36415 COLL VENOUS BLD VENIPUNCTURE: CPT

## 2019-06-15 PROCEDURE — 82306 VITAMIN D 25 HYDROXY: CPT

## 2019-06-24 ENCOUNTER — HOSPITAL ENCOUNTER (OUTPATIENT)
Dept: ULTRASOUND IMAGING | Facility: HOSPITAL | Age: 39
Discharge: HOME | End: 2019-06-24
Attending: OBSTETRICS & GYNECOLOGY
Payer: COMMERCIAL

## 2019-06-24 PROCEDURE — 76830 TRANSVAGINAL US NON-OB: CPT | Performed by: OBSTETRICS & GYNECOLOGY

## 2019-06-24 PROCEDURE — 93975 VASCULAR STUDY: CPT | Performed by: OBSTETRICS & GYNECOLOGY

## 2019-06-24 PROCEDURE — 76856 US EXAM PELVIC COMPLETE: CPT | Performed by: OBSTETRICS & GYNECOLOGY

## 2019-07-25 ENCOUNTER — OFFICE VISIT (OUTPATIENT)
Dept: OBGYN CLINIC | Facility: CLINIC | Age: 39
End: 2019-07-25

## 2019-07-25 ENCOUNTER — OFFICE VISIT (OUTPATIENT)
Dept: FAMILY MEDICINE CLINIC | Facility: CLINIC | Age: 39
End: 2019-07-25

## 2019-07-25 ENCOUNTER — LAB ENCOUNTER (OUTPATIENT)
Dept: LAB | Age: 39
End: 2019-07-25
Attending: FAMILY MEDICINE
Payer: COMMERCIAL

## 2019-07-25 VITALS
TEMPERATURE: 98 F | OXYGEN SATURATION: 99 % | SYSTOLIC BLOOD PRESSURE: 114 MMHG | DIASTOLIC BLOOD PRESSURE: 72 MMHG | WEIGHT: 171 LBS | RESPIRATION RATE: 16 BRPM | HEART RATE: 77 BPM | BODY MASS INDEX: 26.84 KG/M2 | HEIGHT: 67 IN

## 2019-07-25 VITALS
DIASTOLIC BLOOD PRESSURE: 72 MMHG | HEIGHT: 67 IN | SYSTOLIC BLOOD PRESSURE: 110 MMHG | BODY MASS INDEX: 26.84 KG/M2 | WEIGHT: 171 LBS | HEART RATE: 82 BPM

## 2019-07-25 DIAGNOSIS — R09.81 NASAL CONGESTION: ICD-10-CM

## 2019-07-25 DIAGNOSIS — Z12.4 CERVICAL CANCER SCREENING: ICD-10-CM

## 2019-07-25 DIAGNOSIS — Z00.00 LABORATORY EXAMINATION ORDERED AS PART OF A ROUTINE GENERAL MEDICAL EXAMINATION: ICD-10-CM

## 2019-07-25 DIAGNOSIS — Z01.419 ENCOUNTER FOR WELL WOMAN EXAM WITH ROUTINE GYNECOLOGICAL EXAM: Primary | ICD-10-CM

## 2019-07-25 DIAGNOSIS — D22.9 MULTIPLE NEVI: ICD-10-CM

## 2019-07-25 DIAGNOSIS — Z15.09 BRCA2 POSITIVE: ICD-10-CM

## 2019-07-25 DIAGNOSIS — Z13.89 SCREENING FOR GENITOURINARY CONDITION: ICD-10-CM

## 2019-07-25 DIAGNOSIS — Z12.39 BREAST CANCER SCREENING: ICD-10-CM

## 2019-07-25 DIAGNOSIS — E04.2 MULTIPLE THYROID NODULES: ICD-10-CM

## 2019-07-25 DIAGNOSIS — Z15.01 BRCA GENE MUTATION POSITIVE: ICD-10-CM

## 2019-07-25 DIAGNOSIS — Z15.09 BRCA GENE MUTATION POSITIVE: ICD-10-CM

## 2019-07-25 DIAGNOSIS — Z15.01 BRCA2 POSITIVE: ICD-10-CM

## 2019-07-25 DIAGNOSIS — Z00.00 PHYSICAL EXAM, ANNUAL: Primary | ICD-10-CM

## 2019-07-25 LAB
ALBUMIN SERPL-MCNC: 3.9 G/DL (ref 3.4–5)
ALBUMIN/GLOB SERPL: 1.1 {RATIO} (ref 1–2)
ALP LIVER SERPL-CCNC: 42 U/L (ref 37–98)
ALT SERPL-CCNC: 21 U/L (ref 13–56)
ANION GAP SERPL CALC-SCNC: 5 MMOL/L (ref 0–18)
AST SERPL-CCNC: 12 U/L (ref 15–37)
BASOPHILS # BLD AUTO: 0.03 X10(3) UL (ref 0–0.2)
BASOPHILS NFR BLD AUTO: 0.5 %
BILIRUB SERPL-MCNC: 0.4 MG/DL (ref 0.1–2)
BILIRUB UR QL STRIP.AUTO: NEGATIVE
BUN BLD-MCNC: 7 MG/DL (ref 7–18)
BUN/CREAT SERPL: 10.3 (ref 10–20)
CALCIUM BLD-MCNC: 9.2 MG/DL (ref 8.5–10.1)
CHLORIDE SERPL-SCNC: 109 MMOL/L (ref 98–112)
CHOLEST SMN-MCNC: 161 MG/DL (ref ?–200)
CLARITY UR REFRACT.AUTO: CLEAR
CO2 SERPL-SCNC: 26 MMOL/L (ref 21–32)
CREAT BLD-MCNC: 0.68 MG/DL (ref 0.55–1.02)
DEPRECATED RDW RBC AUTO: 40.4 FL (ref 35.1–46.3)
EOSINOPHIL # BLD AUTO: 0.17 X10(3) UL (ref 0–0.7)
EOSINOPHIL NFR BLD AUTO: 2.6 %
ERYTHROCYTE [DISTWIDTH] IN BLOOD BY AUTOMATED COUNT: 12.8 % (ref 11–15)
GLOBULIN PLAS-MCNC: 3.4 G/DL (ref 2.8–4.4)
GLUCOSE BLD-MCNC: 90 MG/DL (ref 70–99)
GLUCOSE UR STRIP.AUTO-MCNC: NEGATIVE MG/DL
HCT VFR BLD AUTO: 43 % (ref 35–48)
HDLC SERPL-MCNC: 55 MG/DL (ref 40–59)
HGB BLD-MCNC: 13.8 G/DL (ref 12–16)
IMM GRANULOCYTES # BLD AUTO: 0.01 X10(3) UL (ref 0–1)
IMM GRANULOCYTES NFR BLD: 0.2 %
KETONES UR STRIP.AUTO-MCNC: NEGATIVE MG/DL
LDLC SERPL CALC-MCNC: 93 MG/DL (ref ?–100)
LEUKOCYTE ESTERASE UR QL STRIP.AUTO: NEGATIVE
LYMPHOCYTES # BLD AUTO: 2.29 X10(3) UL (ref 1–4)
LYMPHOCYTES NFR BLD AUTO: 35.3 %
M PROTEIN MFR SERPL ELPH: 7.3 G/DL (ref 6.4–8.2)
MCH RBC QN AUTO: 27.9 PG (ref 26–34)
MCHC RBC AUTO-ENTMCNC: 32.1 G/DL (ref 31–37)
MCV RBC AUTO: 86.9 FL (ref 80–100)
MONOCYTES # BLD AUTO: 0.44 X10(3) UL (ref 0.1–1)
MONOCYTES NFR BLD AUTO: 6.8 %
NEUTROPHILS # BLD AUTO: 3.55 X10 (3) UL (ref 1.5–7.7)
NEUTROPHILS # BLD AUTO: 3.55 X10(3) UL (ref 1.5–7.7)
NEUTROPHILS NFR BLD AUTO: 54.6 %
NITRITE UR QL STRIP.AUTO: NEGATIVE
NONHDLC SERPL-MCNC: 106 MG/DL (ref ?–130)
OSMOLALITY SERPL CALC.SUM OF ELEC: 288 MOSM/KG (ref 275–295)
PH UR STRIP.AUTO: 8 [PH] (ref 4.5–8)
PLATELET # BLD AUTO: 220 10(3)UL (ref 150–450)
POTASSIUM SERPL-SCNC: 3.9 MMOL/L (ref 3.5–5.1)
PROT UR STRIP.AUTO-MCNC: NEGATIVE MG/DL
RBC # BLD AUTO: 4.95 X10(6)UL (ref 3.8–5.3)
RBC UR QL AUTO: NEGATIVE
SODIUM SERPL-SCNC: 140 MMOL/L (ref 136–145)
SP GR UR STRIP.AUTO: <1.005 (ref 1–1.03)
TRIGL SERPL-MCNC: 67 MG/DL (ref 30–149)
TSI SER-ACNC: 1.01 MIU/ML (ref 0.36–3.74)
UROBILINOGEN UR STRIP.AUTO-MCNC: <2 MG/DL
VLDLC SERPL CALC-MCNC: 13 MG/DL (ref 0–30)
WBC # BLD AUTO: 6.5 X10(3) UL (ref 4–11)

## 2019-07-25 PROCEDURE — 88175 CYTOPATH C/V AUTO FLUID REDO: CPT | Performed by: OBSTETRICS & GYNECOLOGY

## 2019-07-25 PROCEDURE — 36415 COLL VENOUS BLD VENIPUNCTURE: CPT

## 2019-07-25 PROCEDURE — 80061 LIPID PANEL: CPT

## 2019-07-25 PROCEDURE — 99396 PREV VISIT EST AGE 40-64: CPT | Performed by: OBSTETRICS & GYNECOLOGY

## 2019-07-25 PROCEDURE — 84443 ASSAY THYROID STIM HORMONE: CPT

## 2019-07-25 PROCEDURE — 85025 COMPLETE CBC W/AUTO DIFF WBC: CPT

## 2019-07-25 PROCEDURE — 87624 HPV HI-RISK TYP POOLED RSLT: CPT | Performed by: OBSTETRICS & GYNECOLOGY

## 2019-07-25 PROCEDURE — 81003 URINALYSIS AUTO W/O SCOPE: CPT

## 2019-07-25 PROCEDURE — 99396 PREV VISIT EST AGE 40-64: CPT | Performed by: FAMILY MEDICINE

## 2019-07-25 PROCEDURE — 80053 COMPREHEN METABOLIC PANEL: CPT

## 2019-07-25 RX ORDER — FLUTICASONE PROPIONATE 50 MCG
2 SPRAY, SUSPENSION (ML) NASAL DAILY
Qty: 1 BOTTLE | Refills: 1 | Status: SHIPPED | OUTPATIENT
Start: 2019-07-25 | End: 2020-07-19

## 2019-07-25 NOTE — PROGRESS NOTES
Jeremiah Campos is a 37year old female [id number] Patient's last menstrual period was 07/11/2019. Patient presents with:  Wellness Visit  . Patient c/o menses getting heavier, pelvic u/s showed small fibroids.  patient is BRCA 2 positive, had CIS of cervix on file    Tobacco Use      Smoking status: Former Smoker        Years: 2.00        Quit date: 1998        Years since quittin.2      Smokeless tobacco: Never Used    Substance and Sexual Activity      Alcohol use:  Yes        Alcohol/week: 0.0 st ovarian cancer. BRCA   • Cancer Paternal Grandmother 48        lung ca   • Other (Other) Father 64        stroke   • BRCA gene + Self        MEDICATIONS:    Current Outpatient Medications:   •  Fluticasone Propionate 50 MCG/ACT Nasal Suspension, 2 sprays by Appropriate mood and affect    Pelvic Exam:  External Genitalia: normal appearance, hair distribution, and no lesions  Urethral Meatus:  normal in size, location, without lesions and prolapse  Bladder:  No fullness, masses or tenderness  Vagina:  Normal ap

## 2019-07-25 NOTE — PATIENT INSTRUCTIONS
Call [phone] to schedule US of the thyroid. Schedule an appointment with Dr. Sudhir Infante. Schedule appointment with dermatologist.  Start Flonase 2 sprays nostril once a day. See Dr. Andres Adrian after ultrasound of the thyroid. Healthy diet.   Stay active

## 2019-07-25 NOTE — PROGRESS NOTES
HPI:   Tono Osborn is a 37year old female who presents for a complete physical exam. Symptoms: denies discharge, itching, burning or dysuria. She complains of having more sneezing also is having some pulling in her neck.   She thinks that the right Medications:  Fluticasone Propionate 50 MCG/ACT Nasal Suspension 2 sprays by Nasal route daily.  Disp: 1 Bottle Rfl: 1   Omega-3 Fatty Acids (FISH OIL OR) 2 CAPSULES DAILY WITH A MEAL Disp:  Rfl:    Multiple Vitamins-Minerals (MULTIVITAL) Oral Tab Take 1 ta Exercise: three times per week.   Diet: watches calories closely     REVIEW OF SYSTEMS:   GENERAL: feels well otherwise  SKIN: denies any unusual skin lesions  EYES:denies blurred vision or double vision  HEENT: denies nasal congestion, sinus pain or ST This Encounter      CBC W/DIFF      COMP METABOLIC PANEL      LIPID PANEL      UA/M with Culture Reflex      TSH W REFLEX TO FREE T4      Meds & Refills for this Visit:  Requested Prescriptions     Signed Prescriptions Disp Refills   • Fluticasone Propiona

## 2019-07-26 ENCOUNTER — HOSPITAL ENCOUNTER (OUTPATIENT)
Dept: ULTRASOUND IMAGING | Facility: HOSPITAL | Age: 39
Discharge: HOME | End: 2019-07-26
Attending: FAMILY MEDICINE
Payer: COMMERCIAL

## 2019-07-26 DIAGNOSIS — E04.2 MULTIPLE THYROID NODULES: ICD-10-CM

## 2019-07-26 LAB — HPV I/H RISK 1 DNA SPEC QL NAA+PROBE: NEGATIVE

## 2019-07-26 PROCEDURE — 76536 US EXAM OF HEAD AND NECK: CPT | Performed by: FAMILY MEDICINE

## 2019-07-28 DIAGNOSIS — E04.2 MULTINODULAR GOITER: Primary | ICD-10-CM

## 2019-08-05 ENCOUNTER — APPOINTMENT (OUTPATIENT)
Dept: HEMATOLOGY/ONCOLOGY | Facility: HOSPITAL | Age: 39
End: 2019-08-05
Attending: SPECIALIST
Payer: COMMERCIAL

## 2019-08-08 ENCOUNTER — HOSPITAL ENCOUNTER (OUTPATIENT)
Dept: MAMMOGRAPHY | Facility: HOSPITAL | Age: 39
Discharge: HOME | End: 2019-08-08
Attending: OBSTETRICS & GYNECOLOGY
Payer: COMMERCIAL

## 2019-08-08 DIAGNOSIS — Z15.01 BRCA GENE MUTATION POSITIVE: ICD-10-CM

## 2019-08-08 DIAGNOSIS — Z15.09 BRCA GENE MUTATION POSITIVE: ICD-10-CM

## 2019-08-08 DIAGNOSIS — N63.0 BREAST NODULE: Primary | ICD-10-CM

## 2019-08-08 PROCEDURE — 77062 BREAST TOMOSYNTHESIS BI: CPT | Performed by: OBSTETRICS & GYNECOLOGY

## 2019-08-08 PROCEDURE — 77066 DX MAMMO INCL CAD BI: CPT | Performed by: OBSTETRICS & GYNECOLOGY

## 2019-08-08 PROCEDURE — 76641 ULTRASOUND BREAST COMPLETE: CPT | Performed by: OBSTETRICS & GYNECOLOGY

## 2019-08-08 NOTE — PROGRESS NOTES
Patient aware of results per radiology and recommendations for ultrasound breast biopsy. Order placed.

## 2019-08-08 NOTE — IMAGING NOTE
This [facility] Care RN assisted Dr. Praveen Weber with recommendation for a right ultrasound guided biopsy for nodule. Procedure reviewed and all questions answered. Emotional and educational support given.    On the day of the biopsy, pt instructed to take Tylenol

## 2019-08-14 ENCOUNTER — HOSPITAL ENCOUNTER (OUTPATIENT)
Dept: MAMMOGRAPHY | Facility: HOSPITAL | Age: 39
Discharge: HOME | End: 2019-08-14
Attending: OBSTETRICS & GYNECOLOGY
Payer: COMMERCIAL

## 2019-08-14 DIAGNOSIS — R92.8 ABNORMAL MAMMOGRAM OF RIGHT BREAST: ICD-10-CM

## 2019-08-14 DIAGNOSIS — N63.0 BREAST NODULE: ICD-10-CM

## 2019-08-14 PROCEDURE — 88305 TISSUE EXAM BY PATHOLOGIST: CPT | Performed by: OBSTETRICS & GYNECOLOGY

## 2019-08-14 PROCEDURE — 77065 DX MAMMO INCL CAD UNI: CPT | Performed by: OBSTETRICS & GYNECOLOGY

## 2019-08-14 PROCEDURE — 19083 BX BREAST 1ST LESION US IMAG: CPT | Performed by: OBSTETRICS & GYNECOLOGY

## 2019-08-15 ENCOUNTER — TELEPHONE (OUTPATIENT)
Dept: CT IMAGING | Facility: HOSPITAL | Age: 39
End: 2019-08-15

## 2019-08-15 ENCOUNTER — OFFICE VISIT (OUTPATIENT)
Dept: HEMATOLOGY/ONCOLOGY | Facility: HOSPITAL | Age: 39
End: 2019-08-15
Attending: SPECIALIST
Payer: COMMERCIAL

## 2019-08-15 VITALS
BODY MASS INDEX: 27 KG/M2 | WEIGHT: 175 LBS | HEART RATE: 102 BPM | OXYGEN SATURATION: 99 % | RESPIRATION RATE: 18 BRPM | SYSTOLIC BLOOD PRESSURE: 118 MMHG | TEMPERATURE: 99 F | DIASTOLIC BLOOD PRESSURE: 75 MMHG

## 2019-08-15 DIAGNOSIS — Z91.89 HIGH RISK OF OVARIAN CANCER: ICD-10-CM

## 2019-08-15 DIAGNOSIS — Z91.89 AT HIGH RISK FOR BREAST CANCER: Primary | ICD-10-CM

## 2019-08-15 DIAGNOSIS — Z15.01 BRCA2 GENE MUTATION POSITIVE: ICD-10-CM

## 2019-08-15 DIAGNOSIS — Z15.09 BRCA2 POSITIVE: ICD-10-CM

## 2019-08-15 DIAGNOSIS — Z15.01 BRCA2 POSITIVE: ICD-10-CM

## 2019-08-15 DIAGNOSIS — Z15.09 BRCA2 GENE MUTATION POSITIVE: ICD-10-CM

## 2019-08-15 DIAGNOSIS — Z12.39 BREAST CANCER SCREENING, HIGH RISK PATIENT: ICD-10-CM

## 2019-08-15 PROCEDURE — 99215 OFFICE O/P EST HI 40 MIN: CPT | Performed by: SPECIALIST

## 2019-08-15 NOTE — PROGRESS NOTES
Patient is here today for follow up with Maryjo Carrion for BRCA2+ patient. Stated had Mammogram last week and Biopsy yesterday. Patient stated pain from biopsy is a 4 on a scale of 0-10. Medication list and medical history were reviewed and updated.      Tomy Bergeron

## 2019-08-15 NOTE — TELEPHONE ENCOUNTER
Telephoned [employer] and name,  verified with pt. [employer] was of benign biopsy result by Dr. Leana Chamberlain. [employer] reports biopsy site is healing well. Hematoma management discussed.  Radiologist recommends next mammogram is due i

## 2019-09-01 NOTE — PROGRESS NOTES
[facility] Progress Note      Patient Name: Angeles Jha   YOB: 1980  Medical Record Number: [id number]  Attending Physician: Rocío De Paz. Lucrecia Ma M.D.      Date of Visit: 8/15/2019      Chief Complaint  Deleterious BRCA2 mutation child.   Ms. Kenia Hughes mother was diagnosed with breast cancer in her late 25s. Over time, she has had bilateral mastectomy and CAMILO-BSO and is still living at age 62.    She has had genetic testing and also carries the BRCA2 gene mutation, c.7879A>T (p. Wt 79.4 kg (175 lb)   SpO2 99%   BMI 27.41 kg/m²     Physical Examination   Constitutional      Well developed, well nourished. Appears close to chronological age. No apparent distress. Head Normocephalic and atraumatic.   Eyes Conjunctiva clear; sclera interpretation.   DIAGNOSTIC CATEGORY 4a--LOW SUSPICION FOR MALIGNANCY:  RECOMMENDATIONS:  ULTRASOUND-GUIDED BIOPSY: RIGHT BREAST      Pathology  08/14/2019:  Right breast mass, 12:00, 10-gauge ultrasound-guided needle core biopsies: Fibroadenoma in needle However, data have not shown these screening modalities to be sufficiently sensitive or specific for a positive recommendation. If performed, a reasonable interval is every 6-12 months. 6.   Annual dermatologic evaluation for melanoma.      Patient is cur

## 2019-09-19 ENCOUNTER — HOSPITAL ENCOUNTER (OUTPATIENT)
Dept: ULTRASOUND IMAGING | Facility: HOSPITAL | Age: 39
Discharge: HOME | End: 2019-09-19
Attending: OTOLARYNGOLOGY
Payer: COMMERCIAL

## 2019-09-19 DIAGNOSIS — E04.1 THYROID NODULE: ICD-10-CM

## 2019-09-19 DIAGNOSIS — E07.9 THYROID DYSFUNCTION: ICD-10-CM

## 2019-09-19 PROCEDURE — 10005 FNA BX W/US GDN 1ST LES: CPT | Performed by: OTOLARYNGOLOGY

## 2019-09-19 PROCEDURE — 88173 CYTOPATH EVAL FNA REPORT: CPT | Performed by: OTOLARYNGOLOGY

## 2019-09-19 NOTE — PROCEDURES
PROCEDURE:  US FNA THYROID, GUIDED INCLD, FIRST LESION (CPT=10005)     COMPARISON:  JAYLA , US THYROID (USI=[id number]), 7/26/2019, 17:40.      INDICATIONS:  E07.9 Disorder of thyroid, unspecified E04.1 Nontoxic single thyroid nodule     DESCRIPTION:  The risks

## 2019-10-08 PROCEDURE — 88305 TISSUE EXAM BY PATHOLOGIST: CPT | Performed by: DERMATOLOGY

## 2019-10-10 ENCOUNTER — LAB REQUISITION (OUTPATIENT)
Dept: LAB | Facility: HOSPITAL | Age: 39
End: 2019-10-10
Payer: COMMERCIAL

## 2019-10-10 DIAGNOSIS — D49.2 NEOPLASM OF UNSPECIFIED BEHAVIOR OF BONE, SOFT TISSUE, AND SKIN: ICD-10-CM

## 2019-10-14 ENCOUNTER — LAB REQUISITION (OUTPATIENT)
Dept: LAB | Facility: HOSPITAL | Age: 39
End: 2019-10-14
Payer: COMMERCIAL

## 2019-10-14 DIAGNOSIS — D49.2 NEOPLASM OF UNSPECIFIED BEHAVIOR OF BONE, SOFT TISSUE, AND SKIN: ICD-10-CM

## 2019-10-14 PROCEDURE — 88305 TISSUE EXAM BY PATHOLOGIST: CPT | Performed by: DERMATOLOGY

## 2021-08-10 ENCOUNTER — OFFICE VISIT (OUTPATIENT)
Dept: OBGYN CLINIC | Facility: CLINIC | Age: 41
End: 2021-08-10
Payer: COMMERCIAL

## 2021-08-10 VITALS
HEART RATE: 104 BPM | DIASTOLIC BLOOD PRESSURE: 70 MMHG | BODY MASS INDEX: 24.9 KG/M2 | WEIGHT: 158.63 LBS | HEIGHT: 67 IN | SYSTOLIC BLOOD PRESSURE: 116 MMHG

## 2021-08-10 DIAGNOSIS — Z31.41 FERTILITY TESTING: ICD-10-CM

## 2021-08-10 DIAGNOSIS — Z15.09 BRCA2 POSITIVE: ICD-10-CM

## 2021-08-10 DIAGNOSIS — Z01.419 ENCOUNTER FOR WELL WOMAN EXAM WITH ROUTINE GYNECOLOGICAL EXAM: Primary | ICD-10-CM

## 2021-08-10 DIAGNOSIS — Z12.31 BREAST CANCER SCREENING BY MAMMOGRAM: ICD-10-CM

## 2021-08-10 DIAGNOSIS — Z12.4 CERVICAL CANCER SCREENING: ICD-10-CM

## 2021-08-10 DIAGNOSIS — Z15.01 BRCA2 POSITIVE: ICD-10-CM

## 2021-08-10 DIAGNOSIS — E04.9 ENLARGED THYROID: ICD-10-CM

## 2021-08-10 PROCEDURE — 87624 HPV HI-RISK TYP POOLED RSLT: CPT | Performed by: OBSTETRICS & GYNECOLOGY

## 2021-08-10 PROCEDURE — 3008F BODY MASS INDEX DOCD: CPT | Performed by: OBSTETRICS & GYNECOLOGY

## 2021-08-10 PROCEDURE — 88175 CYTOPATH C/V AUTO FLUID REDO: CPT | Performed by: OBSTETRICS & GYNECOLOGY

## 2021-08-10 PROCEDURE — 99396 PREV VISIT EST AGE 40-64: CPT | Performed by: OBSTETRICS & GYNECOLOGY

## 2021-08-10 PROCEDURE — 3078F DIAST BP <80 MM HG: CPT | Performed by: OBSTETRICS & GYNECOLOGY

## 2021-08-10 PROCEDURE — 3074F SYST BP LT 130 MM HG: CPT | Performed by: OBSTETRICS & GYNECOLOGY

## 2021-08-10 NOTE — PROGRESS NOTES
Zahraa Moya is a 35year old female [id number] Patient's last menstrual period was 08/02/2021 (exact date). Patient presents with:  Wellness Visit  Gyn Problem: Pt c/o vaginal itching and white discharge for a while.   .Patient would like to TTC  BRCA 2 p status: Former Smoker        Years: 2.00        Quit date: 1998        Years since quittin.3      Smokeless tobacco: Never Used    Vaping Use      Vaping Use: Never used    Substance and Sexual Activity      Alcohol use: Not Currently        Alco 32   • Cancer Mother 32        breast ca,    • BRCA gene + Mother    • Other (Other) Mother    • Other (varicose veins) Mother    • Ovarian Cancer Maternal Grandmother 48         of ovarian cancer.    • Ovarian Cancer Maternal Aunt 60        Stage 3 ova edema, no cyanosis  Psychiatric:  Oriented to time, place, person and situation.  Appropriate mood and affect    Pelvic Exam:  External Genitalia: normal appearance, hair distribution, and no lesions  Urethral Meatus:  normal in size, location, without lesi

## 2021-08-12 LAB — HPV I/H RISK 1 DNA SPEC QL NAA+PROBE: NEGATIVE

## 2021-08-30 ENCOUNTER — OFFICE VISIT (OUTPATIENT)
Dept: FAMILY MEDICINE CLINIC | Facility: CLINIC | Age: 41
End: 2021-08-30
Payer: COMMERCIAL

## 2021-08-30 ENCOUNTER — LAB ENCOUNTER (OUTPATIENT)
Dept: LAB | Age: 41
End: 2021-08-30
Attending: FAMILY MEDICINE
Payer: COMMERCIAL

## 2021-08-30 VITALS
HEIGHT: 67 IN | RESPIRATION RATE: 16 BRPM | WEIGHT: 161 LBS | TEMPERATURE: 97 F | BODY MASS INDEX: 25.27 KG/M2 | HEART RATE: 66 BPM | SYSTOLIC BLOOD PRESSURE: 102 MMHG | DIASTOLIC BLOOD PRESSURE: 60 MMHG | OXYGEN SATURATION: 99 %

## 2021-08-30 DIAGNOSIS — Z31.41 FERTILITY TESTING: ICD-10-CM

## 2021-08-30 DIAGNOSIS — Z00.00 PHYSICAL EXAM, ANNUAL: Primary | ICD-10-CM

## 2021-08-30 DIAGNOSIS — Z01.419 ENCOUNTER FOR WELL WOMAN EXAM WITH ROUTINE GYNECOLOGICAL EXAM: ICD-10-CM

## 2021-08-30 DIAGNOSIS — Z00.00 LABORATORY TESTS ORDERED AS PART OF A COMPLETE PHYSICAL EXAM (CPE): ICD-10-CM

## 2021-08-30 LAB
ALBUMIN SERPL-MCNC: 3.6 G/DL (ref 3.4–5)
ALBUMIN/GLOB SERPL: 1.1 {RATIO} (ref 1–2)
ALP LIVER SERPL-CCNC: 38 U/L
ALT SERPL-CCNC: 23 U/L
ANION GAP SERPL CALC-SCNC: 5 MMOL/L (ref 0–18)
AST SERPL-CCNC: 14 U/L (ref 15–37)
BASOPHILS # BLD AUTO: 0.02 X10(3) UL (ref 0–0.2)
BASOPHILS NFR BLD AUTO: 0.4 %
BILIRUB SERPL-MCNC: 0.2 MG/DL (ref 0.1–2)
BUN BLD-MCNC: 11 MG/DL (ref 7–18)
CALCIUM BLD-MCNC: 8.4 MG/DL (ref 8.5–10.1)
CHLORIDE SERPL-SCNC: 112 MMOL/L (ref 98–112)
CHOLEST SMN-MCNC: 178 MG/DL (ref ?–200)
CO2 SERPL-SCNC: 25 MMOL/L (ref 21–32)
CREAT BLD-MCNC: 0.68 MG/DL
EOSINOPHIL # BLD AUTO: 0.09 X10(3) UL (ref 0–0.7)
EOSINOPHIL NFR BLD AUTO: 2 %
ERYTHROCYTE [DISTWIDTH] IN BLOOD BY AUTOMATED COUNT: 13.9 %
FSH SERPL-ACNC: 5.6 MIU/ML
GLOBULIN PLAS-MCNC: 3.3 G/DL (ref 2.8–4.4)
GLUCOSE BLD-MCNC: 96 MG/DL (ref 70–99)
HCT VFR BLD AUTO: 39.2 %
HDLC SERPL-MCNC: 55 MG/DL (ref 40–59)
HGB BLD-MCNC: 12 G/DL
IMM GRANULOCYTES # BLD AUTO: 0.01 X10(3) UL (ref 0–1)
IMM GRANULOCYTES NFR BLD: 0.2 %
LDLC SERPL CALC-MCNC: 114 MG/DL (ref ?–100)
LH SERPL-ACNC: 2.6 MIU/ML
LYMPHOCYTES # BLD AUTO: 1.79 X10(3) UL (ref 1–4)
LYMPHOCYTES NFR BLD AUTO: 40.2 %
M PROTEIN MFR SERPL ELPH: 6.9 G/DL (ref 6.4–8.2)
MCH RBC QN AUTO: 26.3 PG (ref 26–34)
MCHC RBC AUTO-ENTMCNC: 30.6 G/DL (ref 31–37)
MCV RBC AUTO: 86 FL
MONOCYTES # BLD AUTO: 0.28 X10(3) UL (ref 0.1–1)
MONOCYTES NFR BLD AUTO: 6.3 %
NEUTROPHILS # BLD AUTO: 2.26 X10 (3) UL (ref 1.5–7.7)
NEUTROPHILS # BLD AUTO: 2.26 X10(3) UL (ref 1.5–7.7)
NEUTROPHILS NFR BLD AUTO: 50.9 %
NONHDLC SERPL-MCNC: 123 MG/DL (ref ?–130)
OSMOLALITY SERPL CALC.SUM OF ELEC: 293 MOSM/KG (ref 275–295)
PATIENT FASTING Y/N/NP: YES
PATIENT FASTING Y/N/NP: YES
PLATELET # BLD AUTO: 194 10(3)UL (ref 150–450)
POTASSIUM SERPL-SCNC: 4.2 MMOL/L (ref 3.5–5.1)
RBC # BLD AUTO: 4.56 X10(6)UL
SODIUM SERPL-SCNC: 142 MMOL/L (ref 136–145)
TRIGL SERPL-MCNC: 44 MG/DL (ref 30–149)
TSI SER-ACNC: 0.71 MIU/ML (ref 0.36–3.74)
VLDLC SERPL CALC-MCNC: 8 MG/DL (ref 0–30)
WBC # BLD AUTO: 4.5 X10(3) UL (ref 4–11)

## 2021-08-30 PROCEDURE — 80061 LIPID PANEL: CPT | Performed by: OBSTETRICS & GYNECOLOGY

## 2021-08-30 PROCEDURE — 3008F BODY MASS INDEX DOCD: CPT | Performed by: FAMILY MEDICINE

## 2021-08-30 PROCEDURE — 99396 PREV VISIT EST AGE 40-64: CPT | Performed by: FAMILY MEDICINE

## 2021-08-30 PROCEDURE — 3078F DIAST BP <80 MM HG: CPT | Performed by: FAMILY MEDICINE

## 2021-08-30 PROCEDURE — 84443 ASSAY THYROID STIM HORMONE: CPT | Performed by: FAMILY MEDICINE

## 2021-08-30 PROCEDURE — 83520 IMMUNOASSAY QUANT NOS NONAB: CPT | Performed by: OBSTETRICS & GYNECOLOGY

## 2021-08-30 PROCEDURE — 83001 ASSAY OF GONADOTROPIN (FSH): CPT | Performed by: OBSTETRICS & GYNECOLOGY

## 2021-08-30 PROCEDURE — 83002 ASSAY OF GONADOTROPIN (LH): CPT | Performed by: OBSTETRICS & GYNECOLOGY

## 2021-08-30 PROCEDURE — 3074F SYST BP LT 130 MM HG: CPT | Performed by: FAMILY MEDICINE

## 2021-08-30 PROCEDURE — 85025 COMPLETE CBC W/AUTO DIFF WBC: CPT | Performed by: OBSTETRICS & GYNECOLOGY

## 2021-08-30 PROCEDURE — 80053 COMPREHEN METABOLIC PANEL: CPT | Performed by: OBSTETRICS & GYNECOLOGY

## 2021-08-30 NOTE — PROGRESS NOTES
HPI:   Esther Jurado is a 35year old female who presents for a complete physical exam. Symptoms: denies discharge, itching, burning or dysuria. She has only one ovary left.   Status post partial hysterectomy and partial cervix removal.  Patient was • Breast tumor right    11/2014   • Cancer [facility]) 2015    cervical CIS   • Cervical cancer [facility]) 2014 Oct   • Endometriosis    • Hyperthyroidism     at 14 y age.     • Miscarriage       Past Surgical History:   Procedure Laterality Date   • CONIZATION CERV pain  NEURO: denies headaches  PSYCHE: denies depression or anxiety  HEMATOLOGIC: denies hx of anemia  ENDOCRINE: denies thyroid history  ALL/ASTHMA: denies hx of allergy or asthma    EXAM:   /60 (BP Location: Left arm, Patient Position: Sitting, Cuf mass index is 25.22 kg/m². , recommended low carb diet and aerobic exercise 30 minutes three times weekly. The patient indicates understanding of these issues and agrees to the plan. The patient is asked to return for CPX in 1 year .

## 2021-09-01 LAB — ANTI-MULLERIAN HORMONE: 0.21 NG/ML

## 2021-09-09 NOTE — PROGRESS NOTES
Patient aware of lab results and recommendation to follow up with fertility specialist. Patient verbalizes understanding.

## 2021-09-15 ENCOUNTER — LAB ENCOUNTER (OUTPATIENT)
Dept: LAB | Facility: HOSPITAL | Age: 41
End: 2021-09-15
Attending: OBSTETRICS & GYNECOLOGY
Payer: COMMERCIAL

## 2021-09-15 ENCOUNTER — HOSPITAL ENCOUNTER (OUTPATIENT)
Dept: MAMMOGRAPHY | Facility: HOSPITAL | Age: 41
Discharge: HOME | End: 2021-09-15
Attending: OBSTETRICS & GYNECOLOGY
Payer: COMMERCIAL

## 2021-09-15 DIAGNOSIS — Z00.00 LABORATORY TESTS ORDERED AS PART OF A COMPLETE PHYSICAL EXAM (CPE): ICD-10-CM

## 2021-09-15 DIAGNOSIS — Z12.31 BREAST CANCER SCREENING BY MAMMOGRAM: ICD-10-CM

## 2021-09-15 LAB
BILIRUB UR QL STRIP.AUTO: NEGATIVE
CLARITY UR REFRACT.AUTO: CLEAR
GLUCOSE UR STRIP.AUTO-MCNC: NEGATIVE MG/DL
KETONES UR STRIP.AUTO-MCNC: NEGATIVE MG/DL
LEUKOCYTE ESTERASE UR QL STRIP.AUTO: NEGATIVE
NITRITE UR QL STRIP.AUTO: NEGATIVE
PH UR STRIP.AUTO: 7 [PH] (ref 5–8)
PROT UR STRIP.AUTO-MCNC: NEGATIVE MG/DL
RBC UR QL AUTO: NEGATIVE
SP GR UR STRIP.AUTO: 1 (ref 1–1.03)
UROBILINOGEN UR STRIP.AUTO-MCNC: <2 MG/DL

## 2021-09-15 PROCEDURE — 77067 SCR MAMMO BI INCL CAD: CPT | Performed by: OBSTETRICS & GYNECOLOGY

## 2021-09-15 PROCEDURE — 81003 URINALYSIS AUTO W/O SCOPE: CPT

## 2021-09-15 PROCEDURE — 77063 BREAST TOMOSYNTHESIS BI: CPT | Performed by: OBSTETRICS & GYNECOLOGY

## 2021-09-20 ENCOUNTER — HOSPITAL ENCOUNTER (OUTPATIENT)
Dept: MAMMOGRAPHY | Facility: HOSPITAL | Age: 41
Discharge: HOME | End: 2021-09-20
Attending: OBSTETRICS & GYNECOLOGY
Payer: COMMERCIAL

## 2021-09-20 DIAGNOSIS — R92.2 INCONCLUSIVE MAMMOGRAM: ICD-10-CM

## 2021-09-20 PROCEDURE — 76642 ULTRASOUND BREAST LIMITED: CPT | Performed by: OBSTETRICS & GYNECOLOGY

## 2021-09-20 PROCEDURE — 77061 BREAST TOMOSYNTHESIS UNI: CPT | Performed by: OBSTETRICS & GYNECOLOGY

## 2021-09-20 PROCEDURE — 77065 DX MAMMO INCL CAD UNI: CPT | Performed by: OBSTETRICS & GYNECOLOGY

## 2021-09-27 ENCOUNTER — HOSPITAL ENCOUNTER (OUTPATIENT)
Dept: ULTRASOUND IMAGING | Facility: HOSPITAL | Age: 41
Discharge: HOME | End: 2021-09-27
Attending: OBSTETRICS & GYNECOLOGY
Payer: COMMERCIAL

## 2021-09-27 DIAGNOSIS — E04.9 ENLARGED THYROID: ICD-10-CM

## 2021-09-27 PROCEDURE — 76536 US EXAM OF HEAD AND NECK: CPT | Performed by: OBSTETRICS & GYNECOLOGY

## 2021-09-30 ENCOUNTER — HOSPITAL ENCOUNTER (OUTPATIENT)
Dept: ULTRASOUND IMAGING | Age: 41
Discharge: HOME | End: 2021-09-30
Attending: OBSTETRICS & GYNECOLOGY
Payer: COMMERCIAL

## 2021-09-30 DIAGNOSIS — Z15.09 BRCA2 POSITIVE: ICD-10-CM

## 2021-09-30 DIAGNOSIS — Z15.01 BRCA2 POSITIVE: ICD-10-CM

## 2021-09-30 PROCEDURE — 76856 US EXAM PELVIC COMPLETE: CPT | Performed by: OBSTETRICS & GYNECOLOGY

## 2021-09-30 PROCEDURE — 76830 TRANSVAGINAL US NON-OB: CPT | Performed by: OBSTETRICS & GYNECOLOGY

## 2021-10-04 DIAGNOSIS — D21.9 FIBROIDS: ICD-10-CM

## 2021-10-04 DIAGNOSIS — Z15.01 BRCA2 POSITIVE: Primary | ICD-10-CM

## 2021-10-04 DIAGNOSIS — Z15.09 BRCA2 POSITIVE: Primary | ICD-10-CM

## 2021-10-04 NOTE — PROGRESS NOTES
August Penn,  Based on your ultrasound results, it looks like your fibroids have increased in size. Dr. Temi Mercado recommends repeating the ultrasound in 6-12 months to follow-up. Please call our office with questions.   Thanks,   Sandoval Owens RN

## 2021-10-07 NOTE — PROGRESS NOTES
Spoke with pt. Questions answered. Pt will go in 6 months for her follow up. Verbalized understanding.

## 2023-06-16 ENCOUNTER — OFFICE VISIT (OUTPATIENT)
Dept: FAMILY MEDICINE CLINIC | Facility: CLINIC | Age: 43
End: 2023-06-16
Payer: COMMERCIAL

## 2023-06-16 ENCOUNTER — LAB ENCOUNTER (OUTPATIENT)
Dept: LAB | Age: 43
End: 2023-06-16
Attending: FAMILY MEDICINE
Payer: COMMERCIAL

## 2023-06-16 VITALS
SYSTOLIC BLOOD PRESSURE: 108 MMHG | DIASTOLIC BLOOD PRESSURE: 74 MMHG | TEMPERATURE: 98 F | HEIGHT: 67 IN | HEART RATE: 78 BPM | BODY MASS INDEX: 26.21 KG/M2 | RESPIRATION RATE: 18 BRPM | WEIGHT: 167 LBS

## 2023-06-16 DIAGNOSIS — Z12.11 SCREENING FOR COLON CANCER: ICD-10-CM

## 2023-06-16 DIAGNOSIS — Z00.00 PHYSICAL EXAM, ANNUAL: ICD-10-CM

## 2023-06-16 DIAGNOSIS — R73.9 HYPERGLYCEMIA: Primary | ICD-10-CM

## 2023-06-16 DIAGNOSIS — Z00.00 PHYSICAL EXAM, ANNUAL: Primary | ICD-10-CM

## 2023-06-16 DIAGNOSIS — R73.9 HYPERGLYCEMIA: ICD-10-CM

## 2023-06-16 DIAGNOSIS — E55.9 VITAMIN D DEFICIENCY: ICD-10-CM

## 2023-06-16 DIAGNOSIS — E04.2 MULTIPLE THYROID NODULES: ICD-10-CM

## 2023-06-16 DIAGNOSIS — Z00.00 ROUTINE GENERAL MEDICAL EXAMINATION AT A HEALTH CARE FACILITY: ICD-10-CM

## 2023-06-16 DIAGNOSIS — Z13.89 SCREENING FOR GENITOURINARY CONDITION: ICD-10-CM

## 2023-06-16 LAB
ALBUMIN SERPL-MCNC: 3.7 G/DL (ref 3.4–5)
ALBUMIN/GLOB SERPL: 0.9 {RATIO} (ref 1–2)
ALP LIVER SERPL-CCNC: 46 U/L
ALT SERPL-CCNC: 24 U/L
ANION GAP SERPL CALC-SCNC: 6 MMOL/L (ref 0–18)
AST SERPL-CCNC: 14 U/L (ref 15–37)
BASOPHILS # BLD AUTO: 0.04 X10(3) UL (ref 0–0.2)
BASOPHILS NFR BLD AUTO: 0.6 %
BILIRUB SERPL-MCNC: 0.5 MG/DL (ref 0.1–2)
BILIRUB UR QL STRIP.AUTO: NEGATIVE
BUN BLD-MCNC: 9 MG/DL (ref 7–18)
CALCIUM BLD-MCNC: 9.1 MG/DL (ref 8.5–10.1)
CHLORIDE SERPL-SCNC: 110 MMOL/L (ref 98–112)
CHOLEST SERPL-MCNC: 196 MG/DL (ref ?–200)
CLARITY UR REFRACT.AUTO: CLEAR
CO2 SERPL-SCNC: 21 MMOL/L (ref 21–32)
COLOR UR AUTO: YELLOW
CREAT BLD-MCNC: 0.84 MG/DL
EOSINOPHIL # BLD AUTO: 0.08 X10(3) UL (ref 0–0.7)
EOSINOPHIL NFR BLD AUTO: 1.2 %
ERYTHROCYTE [DISTWIDTH] IN BLOOD BY AUTOMATED COUNT: 14.4 %
EST. AVERAGE GLUCOSE BLD GHB EST-MCNC: 123 MG/DL (ref 68–126)
FASTING PATIENT LIPID ANSWER: YES
FASTING STATUS PATIENT QL REPORTED: YES
GFR SERPLBLD BASED ON 1.73 SQ M-ARVRAT: 87 ML/MIN/1.73M2 (ref 60–?)
GLOBULIN PLAS-MCNC: 4.2 G/DL (ref 2.8–4.4)
GLUCOSE BLD-MCNC: 112 MG/DL (ref 70–99)
GLUCOSE UR STRIP.AUTO-MCNC: NEGATIVE MG/DL
HBA1C MFR BLD: 5.9 % (ref ?–5.7)
HCT VFR BLD AUTO: 39.7 %
HDLC SERPL-MCNC: 78 MG/DL (ref 40–59)
HGB BLD-MCNC: 12.4 G/DL
IMM GRANULOCYTES # BLD AUTO: 0.02 X10(3) UL (ref 0–1)
IMM GRANULOCYTES NFR BLD: 0.3 %
KETONES UR STRIP.AUTO-MCNC: NEGATIVE MG/DL
LDLC SERPL CALC-MCNC: 108 MG/DL (ref ?–100)
LEUKOCYTE ESTERASE UR QL STRIP.AUTO: NEGATIVE
LYMPHOCYTES # BLD AUTO: 2.02 X10(3) UL (ref 1–4)
LYMPHOCYTES NFR BLD AUTO: 31.3 %
MCH RBC QN AUTO: 24.8 PG (ref 26–34)
MCHC RBC AUTO-ENTMCNC: 31.2 G/DL (ref 31–37)
MCV RBC AUTO: 79.4 FL
MONOCYTES # BLD AUTO: 0.38 X10(3) UL (ref 0.1–1)
MONOCYTES NFR BLD AUTO: 5.9 %
NEUTROPHILS # BLD AUTO: 3.92 X10 (3) UL (ref 1.5–7.7)
NEUTROPHILS # BLD AUTO: 3.92 X10(3) UL (ref 1.5–7.7)
NEUTROPHILS NFR BLD AUTO: 60.7 %
NITRITE UR QL STRIP.AUTO: NEGATIVE
NONHDLC SERPL-MCNC: 118 MG/DL (ref ?–130)
OSMOLALITY SERPL CALC.SUM OF ELEC: 283 MOSM/KG (ref 275–295)
PH UR STRIP.AUTO: 5 [PH] (ref 5–8)
PLATELET # BLD AUTO: 270 10(3)UL (ref 150–450)
POTASSIUM SERPL-SCNC: 4.2 MMOL/L (ref 3.5–5.1)
PROT SERPL-MCNC: 7.9 G/DL (ref 6.4–8.2)
PROT UR STRIP.AUTO-MCNC: NEGATIVE MG/DL
RBC # BLD AUTO: 5 X10(6)UL
SODIUM SERPL-SCNC: 137 MMOL/L (ref 136–145)
SP GR UR STRIP.AUTO: 1.02 (ref 1–1.03)
TRIGL SERPL-MCNC: 53 MG/DL (ref 30–149)
TSI SER-ACNC: 0.69 MIU/ML (ref 0.36–3.74)
UROBILINOGEN UR STRIP.AUTO-MCNC: <2 MG/DL
VIT D+METAB SERPL-MCNC: 43.4 NG/ML (ref 30–100)
VLDLC SERPL CALC-MCNC: 9 MG/DL (ref 0–30)
WBC # BLD AUTO: 6.5 X10(3) UL (ref 4–11)

## 2023-06-16 PROCEDURE — 84443 ASSAY THYROID STIM HORMONE: CPT

## 2023-06-16 PROCEDURE — 3078F DIAST BP <80 MM HG: CPT | Performed by: FAMILY MEDICINE

## 2023-06-16 PROCEDURE — 3008F BODY MASS INDEX DOCD: CPT | Performed by: FAMILY MEDICINE

## 2023-06-16 PROCEDURE — 85025 COMPLETE CBC W/AUTO DIFF WBC: CPT

## 2023-06-16 PROCEDURE — 80061 LIPID PANEL: CPT

## 2023-06-16 PROCEDURE — 81001 URINALYSIS AUTO W/SCOPE: CPT

## 2023-06-16 PROCEDURE — 3074F SYST BP LT 130 MM HG: CPT | Performed by: FAMILY MEDICINE

## 2023-06-16 PROCEDURE — 99396 PREV VISIT EST AGE 40-64: CPT | Performed by: FAMILY MEDICINE

## 2023-06-16 PROCEDURE — 82306 VITAMIN D 25 HYDROXY: CPT

## 2023-06-16 PROCEDURE — 83036 HEMOGLOBIN GLYCOSYLATED A1C: CPT

## 2023-06-16 PROCEDURE — 80053 COMPREHEN METABOLIC PANEL: CPT

## 2023-06-16 NOTE — PATIENT INSTRUCTIONS
Call [phone] to schedule thyroid. Set appointment with GI doctor for colonoscopy. Do fasting blood work today. Healthy diet. Stay active.

## 2023-06-26 ENCOUNTER — HOSPITAL ENCOUNTER (OUTPATIENT)
Dept: ULTRASOUND IMAGING | Facility: HOSPITAL | Age: 43
Discharge: HOME | End: 2023-06-26
Attending: FAMILY MEDICINE
Payer: COMMERCIAL

## 2023-06-26 DIAGNOSIS — E04.2 MULTIPLE THYROID NODULES: ICD-10-CM

## 2023-06-26 PROCEDURE — 76536 US EXAM OF HEAD AND NECK: CPT | Performed by: FAMILY MEDICINE

## 2023-07-25 ENCOUNTER — OFFICE VISIT (OUTPATIENT)
Facility: CLINIC | Age: 43
End: 2023-07-25
Payer: COMMERCIAL

## 2023-07-25 VITALS
HEIGHT: 67 IN | WEIGHT: 173.19 LBS | HEART RATE: 76 BPM | SYSTOLIC BLOOD PRESSURE: 108 MMHG | DIASTOLIC BLOOD PRESSURE: 76 MMHG | BODY MASS INDEX: 27.18 KG/M2

## 2023-07-25 DIAGNOSIS — Z15.01 BRCA2 POSITIVE: ICD-10-CM

## 2023-07-25 DIAGNOSIS — Z12.4 CERVICAL CANCER SCREENING: ICD-10-CM

## 2023-07-25 DIAGNOSIS — Z12.31 BREAST CANCER SCREENING BY MAMMOGRAM: ICD-10-CM

## 2023-07-25 DIAGNOSIS — Z01.419 ENCOUNTER FOR WELL WOMAN EXAM WITH ROUTINE GYNECOLOGICAL EXAM: Primary | ICD-10-CM

## 2023-07-25 DIAGNOSIS — Z15.09 BRCA2 POSITIVE: ICD-10-CM

## 2023-07-25 PROCEDURE — 3074F SYST BP LT 130 MM HG: CPT | Performed by: OBSTETRICS & GYNECOLOGY

## 2023-07-25 PROCEDURE — 3008F BODY MASS INDEX DOCD: CPT | Performed by: OBSTETRICS & GYNECOLOGY

## 2023-07-25 PROCEDURE — 99396 PREV VISIT EST AGE 40-64: CPT | Performed by: OBSTETRICS & GYNECOLOGY

## 2023-07-25 PROCEDURE — 87624 HPV HI-RISK TYP POOLED RSLT: CPT | Performed by: OBSTETRICS & GYNECOLOGY

## 2023-07-25 PROCEDURE — 3078F DIAST BP <80 MM HG: CPT | Performed by: OBSTETRICS & GYNECOLOGY

## 2023-07-25 RX ORDER — NORETHINDRONE ACETATE AND ETHINYL ESTRADIOL 1MG-20(21)
1 KIT ORAL DAILY
Qty: 84 TABLET | Refills: 3 | Status: SHIPPED | OUTPATIENT
Start: 2023-07-25 | End: 2024-07-24

## 2023-07-25 NOTE — PROGRESS NOTES
Marylou Zuñiga is a 37year old female [id number] Patient's last menstrual period was 2023 (exact date). Patient presents with:  Wellness Visit: No concerns   Last pap 8/10/21 neg   . BRCA 2 positive, h/o CIS cervical - recommend prophylactic CAMILO, h/o RSO - she would like to wait till she goes through menopause   Patient had been seeing oncologist several years ago who would order her mammograms but stopped going there as she feels she can get same orders from PCP or gyn services   Will order mammogram followed by MRI in 6 month and continue   Menses every 28 days - discussed OCP - decreasing ovarian cancer risk     OBSTETRICS HISTORY:  OB History    Para Term  AB Living   3 2 2   1 2   SAB IAB Ectopic Multiple Live Births   1              # Outcome Date GA Lbr Abhay/2nd Weight Sex Delivery Anes PTL Lv   3 Term 01 42w0d   F NORMAL SPONT      2 Term 99 41w0d   M NORMAL SPONT      1 SAB                GYNE HISTORY:  Periods regular monthly      Sexual activity:   Yes      Partners:   Male      Birth control/ protection:   Vasectomy        Pap Date: 08/10/21  Pap Result Notes: neg/neg        MEDICAL HISTORY:  Past Medical History:   Diagnosis Date    BRCA2 positive 2014    Breast tumor right    2014    Cancer ([facility] [address] 2015    cervical CIS    Cervical cancer ([facility] [address] 10/01/2014    Endometriosis     Fibroids     Hyperthyroidism     at 14 y age.      Miscarriage        SURGICAL HISTORY:  Past Surgical History:   Procedure Laterality Date    Breast surgery      Conization cervix,knife/laser      cervical CIS    Cyst removal      Endometrial ablation      Laparoscopy,pelvic,biopsy      Ricco biopsy stereo nodule 1 site right (cpt=19081)      2014    Needle biopsy right  2018    fibroadenoma    Needle biopsy right  2019    10:00- benign    Oophorectomy Right     2015    Other surgical history  2014    R breast bx     Other surgical history      thyroid biopsy    Other surgical history  2014    MRI breast biopsies at [facility]:  Social History    Socioeconomic History      Marital status:       Spouse name: Not on file      Number of children: Not on file      Years of education: Not on file      Highest education level: Not on file    Occupational History      Not on file    Tobacco Use      Smoking status: Former        Years: 2.00        Types: Cigarettes        Quit date: 1998        Years since quittin.2      Smokeless tobacco: Never    Vaping Use      Vaping Use: Never used    Substance and Sexual Activity      Alcohol use: Not Currently        Comment: rarely      Drug use: Never      Sexual activity: Yes        Partners: Male        Birth control/protection: Vasectomy    Other Topics      Concerns:         Service: Not Asked        Blood Transfusions: Not Asked        Caffeine Concern: No        Occupational Exposure: Not Asked        Hobby Hazards: Not Asked        Sleep Concern: Not Asked        Stress Concern: No        Weight Concern: No        Special Diet: No        Back Care: Not Asked        Exercise: Yes        Bike Helmet: Not Asked        Seat Belt: Yes        Self-Exams: Yes    Social History Narrative      Not on file    Social Determinants of Health  Financial Resource Strain: Not on file  Food Insecurity: Not on file  Transportation Needs: Not on file  Physical Activity: Not on file  Stress: Not on file  Social Connections: Not on file  Housing Stability: Not on file    FAMILY HISTORY:  Family History   Problem Relation Age of Onset    Breast Cancer Mother 32    Cancer Mother 32        breast ca,     BRCA gene + Mother     Other (Other) Mother     Other (varicose veins) Mother     Ovarian Cancer Maternal Grandmother 48         of ovarian cancer. Ovarian Cancer Maternal Aunt 60        Stage 3 ovarian cancer. BRCA    Cancer Paternal Grandmother 48        lung ca    Other (Other) Father 64        stroke BRCA gene + Self        MEDICATIONS:    Current Outpatient Medications:     Norethin Ace-Eth Estrad-FE (JUNEL FE 1/20) 1-20 MG-MCG Oral Tab, Take 1 tablet by mouth daily. , Disp: 84 tablet, Rfl: 3    Omega-3 Fatty Acids (FISH OIL OR), 2 CAPSULES DAILY WITH A MEAL, Disp: , Rfl:     cholecalciferol 1000 units Oral Cap, Take one capsule by mouth once a day on all days not taking prescription strength vitamin d., Disp: , Rfl: 0    ALLERGIES:  No Known Allergies      Review of Systems:  Constitutional:  Denies fatigue, night sweats, hot flashes  Eyes:  denies blurred or double vision  Cardiovascular:  denies chest pain or palpitations  Respiratory:  denies shortness of breath  Gastrointestinal:  denies heartburn, abdominal pain, diarrhea or constipation  Genitourinary:  denies dysuria, incontinence, abnormal vaginal discharge, vaginal itching  Musculoskeletal:  denies back pain. Skin/Breast:  Denies any breast pain, lumps, or discharge. Neurological:  denies headaches, extremity weakness or numbness. Psychiatric: denies depression or anxiety. Endocrine:   denies excessive thirst or urination. Heme/Lymph:  denies history of anemia, easy bruising or bleeding. PHYSICAL EXAM:   Constitutional: well developed, well nourished  Head/Face: normocephalic  Neck/Thyroid: thyroid symmetric, no thyromegaly, no nodules, no adenopathy  Lymphatic:no abnormal supraclavicular or axillary adenopathy is noted  Breast: normal without palpable masses, tenderness, asymmetry, nipple discharge, nipple retraction or skin changes  Abdomen:  soft, nontender, nondistended, no masses  Skin/Hair: no unusual rashes or bruises  Extremities: no edema, no cyanosis  Psychiatric:  Oriented to time, place, person and situation.  Appropriate mood and affect    Pelvic Exam:  External Genitalia: normal appearance, hair distribution, and no lesions  Urethral Meatus:  normal in size, location, without lesions and prolapse  Bladder:  No fullness, masses or tenderness  Vagina:  Normal appearance without lesions, no abnormal discharge  Cervix:  Normal without tenderness on motion  Uterus: normal in size, contour, position, mobility, without tenderness  Adnexa: normal without masses or tenderness  Perineum: normal  Anus: no hemorroids     Assessment & Plan:  Diagnoses and all orders for this visit:    Encounter for well woman exam with routine gynecological exam    Cervical cancer screening  -     HPV HIGH RISK , THIN PREP COLLECTION; Future  -     THINPREP PAP SMEAR B; Future    Breast cancer screening by mammogram  -     [facility] ISREAL 2D+3D SCREENING BILAT (CPT=77067/65103); Future    BRCA2 positive  -     US TRANSVAG/ABDOMINAL EMG ONLY; Future    Other orders  -     Norethin Ace-Eth Estrad-FE (JUNEL FE 1/20) 1-20 MG-MCG Oral Tab; Take 1 tablet by mouth daily.

## 2023-07-26 LAB — HPV I/H RISK 1 DNA SPEC QL NAA+PROBE: NEGATIVE

## 2023-08-01 ENCOUNTER — HOSPITAL ENCOUNTER (OUTPATIENT)
Dept: MAMMOGRAPHY | Facility: HOSPITAL | Age: 43
Discharge: HOME | End: 2023-08-01
Attending: OBSTETRICS & GYNECOLOGY
Payer: COMMERCIAL

## 2023-08-01 DIAGNOSIS — Z12.31 BREAST CANCER SCREENING BY MAMMOGRAM: ICD-10-CM

## 2023-08-01 PROCEDURE — 77067 SCR MAMMO BI INCL CAD: CPT | Performed by: OBSTETRICS & GYNECOLOGY

## 2023-08-01 PROCEDURE — 77063 BREAST TOMOSYNTHESIS BI: CPT | Performed by: OBSTETRICS & GYNECOLOGY

## 2023-08-03 ENCOUNTER — ULTRASOUND ENCOUNTER (OUTPATIENT)
Facility: CLINIC | Age: 43
End: 2023-08-03
Payer: COMMERCIAL

## 2025-01-06 ENCOUNTER — OFFICE VISIT (OUTPATIENT)
Facility: CLINIC | Age: 45
End: 2025-01-06
Payer: COMMERCIAL

## 2025-01-06 ENCOUNTER — LAB ENCOUNTER (OUTPATIENT)
Dept: LAB | Facility: HOSPITAL | Age: 45
End: 2025-01-06
Attending: OBSTETRICS & GYNECOLOGY
Payer: COMMERCIAL

## 2025-01-06 VITALS
HEART RATE: 80 BPM | BODY MASS INDEX: 29.66 KG/M2 | SYSTOLIC BLOOD PRESSURE: 112 MMHG | WEIGHT: 189 LBS | DIASTOLIC BLOOD PRESSURE: 66 MMHG | HEIGHT: 67 IN

## 2025-01-06 DIAGNOSIS — Z01.419 ENCOUNTER FOR WELL WOMAN EXAM WITH ROUTINE GYNECOLOGICAL EXAM: ICD-10-CM

## 2025-01-06 DIAGNOSIS — Z12.31 BREAST CANCER SCREENING BY MAMMOGRAM: ICD-10-CM

## 2025-01-06 DIAGNOSIS — Z01.419 ENCOUNTER FOR WELL WOMAN EXAM WITH ROUTINE GYNECOLOGICAL EXAM: Primary | ICD-10-CM

## 2025-01-06 DIAGNOSIS — N92.6 IRREGULAR MENSES: ICD-10-CM

## 2025-01-06 DIAGNOSIS — Z12.4 CERVICAL CANCER SCREENING: ICD-10-CM

## 2025-01-06 DIAGNOSIS — Z15.09 BRCA2 POSITIVE: ICD-10-CM

## 2025-01-06 DIAGNOSIS — Z15.01 BRCA2 POSITIVE: ICD-10-CM

## 2025-01-06 LAB
ALBUMIN SERPL-MCNC: 4.1 G/DL (ref 3.2–4.8)
ALBUMIN/GLOB SERPL: 1.3 {RATIO} (ref 1–2)
ALP LIVER SERPL-CCNC: 53 U/L
ALT SERPL-CCNC: 17 U/L
ANION GAP SERPL CALC-SCNC: 6 MMOL/L (ref 0–18)
AST SERPL-CCNC: 16 U/L (ref ?–34)
BASOPHILS # BLD AUTO: 0.03 X10(3) UL (ref 0–0.2)
BASOPHILS NFR BLD AUTO: 0.4 %
BILIRUB SERPL-MCNC: 0.4 MG/DL (ref 0.3–1.2)
BUN BLD-MCNC: 6 MG/DL (ref 9–23)
CALCIUM BLD-MCNC: 9.5 MG/DL (ref 8.7–10.4)
CHLORIDE SERPL-SCNC: 109 MMOL/L (ref 98–112)
CHOLEST SERPL-MCNC: 198 MG/DL (ref ?–200)
CO2 SERPL-SCNC: 26 MMOL/L (ref 21–32)
CREAT BLD-MCNC: 0.69 MG/DL
EGFRCR SERPLBLD CKD-EPI 2021: 108 ML/MIN/1.73M2 (ref 60–?)
EOSINOPHIL # BLD AUTO: 0.15 X10(3) UL (ref 0–0.7)
EOSINOPHIL NFR BLD AUTO: 2 %
ERYTHROCYTE [DISTWIDTH] IN BLOOD BY AUTOMATED COUNT: 15.9 %
EST. AVERAGE GLUCOSE BLD GHB EST-MCNC: 126 MG/DL (ref 68–126)
FASTING PATIENT LIPID ANSWER: YES
FASTING STATUS PATIENT QL REPORTED: YES
GLOBULIN PLAS-MCNC: 3.1 G/DL (ref 2–3.5)
GLUCOSE BLD-MCNC: 88 MG/DL (ref 70–99)
HBA1C MFR BLD: 6 % (ref ?–5.7)
HCT VFR BLD AUTO: 38.8 %
HDLC SERPL-MCNC: 62 MG/DL (ref 40–59)
HGB BLD-MCNC: 12.5 G/DL
IMM GRANULOCYTES # BLD AUTO: 0.02 X10(3) UL (ref 0–1)
IMM GRANULOCYTES NFR BLD: 0.3 %
LDLC SERPL CALC-MCNC: 118 MG/DL (ref ?–100)
LYMPHOCYTES # BLD AUTO: 2.27 X10(3) UL (ref 1–4)
LYMPHOCYTES NFR BLD AUTO: 30.6 %
MCH RBC QN AUTO: 25 PG (ref 26–34)
MCHC RBC AUTO-ENTMCNC: 32.2 G/DL (ref 31–37)
MCV RBC AUTO: 77.6 FL
MONOCYTES # BLD AUTO: 0.52 X10(3) UL (ref 0.1–1)
MONOCYTES NFR BLD AUTO: 7 %
NEUTROPHILS # BLD AUTO: 4.44 X10 (3) UL (ref 1.5–7.7)
NEUTROPHILS # BLD AUTO: 4.44 X10(3) UL (ref 1.5–7.7)
NEUTROPHILS NFR BLD AUTO: 59.7 %
NONHDLC SERPL-MCNC: 136 MG/DL (ref ?–130)
OSMOLALITY SERPL CALC.SUM OF ELEC: 289 MOSM/KG (ref 275–295)
PLATELET # BLD AUTO: 236 10(3)UL (ref 150–450)
POTASSIUM SERPL-SCNC: 3.8 MMOL/L (ref 3.5–5.1)
PROT SERPL-MCNC: 7.2 G/DL (ref 5.7–8.2)
RBC # BLD AUTO: 5 X10(6)UL
SODIUM SERPL-SCNC: 141 MMOL/L (ref 136–145)
TRIGL SERPL-MCNC: 98 MG/DL (ref 30–149)
TSI SER-ACNC: 1.45 UIU/ML (ref 0.55–4.78)
VIT D+METAB SERPL-MCNC: 64.4 NG/ML (ref 30–100)
VLDLC SERPL CALC-MCNC: 17 MG/DL (ref 0–30)
WBC # BLD AUTO: 7.4 X10(3) UL (ref 4–11)

## 2025-01-06 PROCEDURE — 87624 HPV HI-RISK TYP POOLED RSLT: CPT | Performed by: OBSTETRICS & GYNECOLOGY

## 2025-01-06 PROCEDURE — 36415 COLL VENOUS BLD VENIPUNCTURE: CPT

## 2025-01-06 PROCEDURE — 3078F DIAST BP <80 MM HG: CPT | Performed by: OBSTETRICS & GYNECOLOGY

## 2025-01-06 PROCEDURE — 3074F SYST BP LT 130 MM HG: CPT | Performed by: OBSTETRICS & GYNECOLOGY

## 2025-01-06 PROCEDURE — 85025 COMPLETE CBC W/AUTO DIFF WBC: CPT

## 2025-01-06 PROCEDURE — 99396 PREV VISIT EST AGE 40-64: CPT | Performed by: OBSTETRICS & GYNECOLOGY

## 2025-01-06 PROCEDURE — 82306 VITAMIN D 25 HYDROXY: CPT

## 2025-01-06 PROCEDURE — 83036 HEMOGLOBIN GLYCOSYLATED A1C: CPT

## 2025-01-06 PROCEDURE — 84443 ASSAY THYROID STIM HORMONE: CPT

## 2025-01-06 PROCEDURE — 80053 COMPREHEN METABOLIC PANEL: CPT

## 2025-01-06 PROCEDURE — 3008F BODY MASS INDEX DOCD: CPT | Performed by: OBSTETRICS & GYNECOLOGY

## 2025-01-06 PROCEDURE — 80061 LIPID PANEL: CPT

## 2025-01-07 LAB — HPV E6+E7 MRNA CVX QL NAA+PROBE: NEGATIVE

## 2025-01-08 LAB
.: NORMAL
.: NORMAL

## 2025-01-13 ENCOUNTER — TELEPHONE (OUTPATIENT)
Facility: CLINIC | Age: 45
End: 2025-01-13

## 2025-01-14 ENCOUNTER — ULTRASOUND ENCOUNTER (OUTPATIENT)
Facility: CLINIC | Age: 45
End: 2025-01-14
Payer: COMMERCIAL

## 2025-01-20 DIAGNOSIS — N92.6 IRREGULAR BLEEDING: Primary | ICD-10-CM

## 2025-01-22 ENCOUNTER — HOSPITAL ENCOUNTER (OUTPATIENT)
Dept: MAMMOGRAPHY | Facility: HOSPITAL | Age: 45
Discharge: HOME | End: 2025-01-22
Attending: OBSTETRICS & GYNECOLOGY
Payer: COMMERCIAL

## 2025-01-22 DIAGNOSIS — Z12.31 BREAST CANCER SCREENING BY MAMMOGRAM: ICD-10-CM

## 2025-01-22 DIAGNOSIS — Z15.01 BRCA2 POSITIVE: ICD-10-CM

## 2025-01-22 DIAGNOSIS — Z15.09 BRCA2 POSITIVE: ICD-10-CM

## 2025-01-22 PROCEDURE — 77067 SCR MAMMO BI INCL CAD: CPT | Performed by: OBSTETRICS & GYNECOLOGY

## 2025-01-22 PROCEDURE — 77063 BREAST TOMOSYNTHESIS BI: CPT | Performed by: OBSTETRICS & GYNECOLOGY

## 2025-01-24 ENCOUNTER — HOSPITAL ENCOUNTER (OUTPATIENT)
Dept: MAMMOGRAPHY | Facility: HOSPITAL | Age: 45
Discharge: HOME | End: 2025-01-24
Attending: OBSTETRICS & GYNECOLOGY
Payer: COMMERCIAL

## 2025-01-24 DIAGNOSIS — R92.2 INCONCLUSIVE MAMMOGRAM: ICD-10-CM

## 2025-01-24 PROCEDURE — 77061 BREAST TOMOSYNTHESIS UNI: CPT | Performed by: OBSTETRICS & GYNECOLOGY

## 2025-01-24 PROCEDURE — 76642 ULTRASOUND BREAST LIMITED: CPT | Performed by: OBSTETRICS & GYNECOLOGY

## 2025-01-24 PROCEDURE — 77065 DX MAMMO INCL CAD UNI: CPT | Performed by: OBSTETRICS & GYNECOLOGY

## 2025-01-30 NOTE — PROGRESS NOTES
Isamar Silver is a 44 year old female  Patient's last menstrual period was 2024.   Chief Complaint   Patient presents with    Gyn Problem     Irregular menses cycles  - 24 pt had brown discharge for a few days  - Sept and Oct pt c/o of random spotting   .  Patient c/o irregular periods with sporadic spotting , she has BRCA2 mutation but declined risk reducing surgeries so far  OBSTETRICS HISTORY:  OB History    Para Term  AB Living   3 2 2   1 2   SAB IAB Ectopic Multiple Live Births   1              # Outcome Date GA Lbr Abhay/2nd Weight Sex Type Anes PTL Lv   3 Term 01 42w0d   F NORMAL SPONT      2 Term 99 41w0d   M NORMAL SPONT      1 SAB                GYNE HISTORY:  Periods irregular light    History   Sexual Activity    Sexual activity: Yes    Partners: Male    Birth control/ protection: Vasectomy        Pap Date: 23  Pap Result Notes: Negative        MEDICAL HISTORY:  Past Medical History:    BRCA2 positive    Breast tumor    2014    Cancer ([facility])    cervical CIS    Cervical cancer ([facility])    Endometriosis    Fibroids    Hyperthyroidism    at 15 y age.     Miscarriage ([facility])       SURGICAL HISTORY:  Past Surgical History:   Procedure Laterality Date    Breast surgery      Conization cervix,knife/laser      cervical CIS    Cyst removal      Endometrial ablation      Laparoscopy,pelvic,biopsy      Ricco biopsy stereo nodule 1 site right (cpt=19081)      Benign    Ricco localization wire 1 site right (cpt=19281)      Benign    Needle biopsy right  2018    US guided bx- benign    Needle biopsy right  2019    US guided bx- benign    Oophorectomy Right     2015    Other surgical history      thyroid biopsy       SOCIAL HISTORY:  Social History     Socioeconomic History    Marital status:      Spouse name: Not on file    Number of children: Not on file    Years of education: Not on file    Highest education level: Not on file   Occupational  History    Not on file   Tobacco Use    Smoking status: Former     Current packs/day: 0.00     Types: Cigarettes     Start date: 1996     Quit date: 1998     Years since quittin.8    Smokeless tobacco: Never   Vaping Use    Vaping status: Never Used   Substance and Sexual Activity    Alcohol use: Not Currently     Comment: rarely    Drug use: Never    Sexual activity: Yes     Partners: Male     Birth control/protection: Vasectomy   Other Topics Concern     Service Not Asked    Blood Transfusions Not Asked    Caffeine Concern No    Occupational Exposure Not Asked    Hobby Hazards Not Asked    Sleep Concern Not Asked    Stress Concern No    Weight Concern No    Special Diet No    Back Care Not Asked    Exercise Yes    Bike Helmet Not Asked    Seat Belt Yes    Self-Exams Yes   Social History Narrative    Not on file     Social Drivers of Health     Financial Resource Strain: Not on file   Food Insecurity: Not on file   Transportation Needs: Not on file   Physical Activity: Not on file   Stress: Not on file   Social Connections: Not on file   Housing Stability: Not on file       FAMILY HISTORY:  Family History   Problem Relation Age of Onset    Breast Cancer Mother 31    Cancer Mother 27        breast ca,     BRCA gene + Mother     Other (Other) Mother     Other (varicose veins) Mother     Ovarian Cancer Maternal Grandmother 50         of ovarian cancer.    Ovarian Cancer Maternal Aunt 60        Stage 3 ovarian cancer.BRCA    Cancer Paternal Grandmother 50        lung ca    Other (Other) Father 61        stroke    BRCA gene + Self        MEDICATIONS:    Current Outpatient Medications:     Omega-3 Fatty Acids (FISH OIL OR), 2 CAPSULES DAILY WITH A MEAL, Disp: , Rfl:     cholecalciferol 1000 units Oral Cap, Take one capsule by mouth once a day on all days not taking prescription strength vitamin d., Disp: , Rfl: 0    Norethin Ace-Eth Estrad-FE (JUNEL FE 1/20) 1-20 MG-MCG Oral Tab, Take 1 tablet  by mouth daily., Disp: 84 tablet, Rfl: 3    ALLERGIES:  Allergies[1]      Review of Systems:  Constitutional:  Denies fatigue, night sweats, hot flashes  Eyes:  denies blurred or double vision  Cardiovascular:  denies chest pain or palpitations  Respiratory:  denies shortness of breath  Gastrointestinal:  denies heartburn, abdominal pain, diarrhea or constipation  Genitourinary:  denies dysuria, incontinence, abnormal vaginal discharge, vaginal itching  Musculoskeletal:  denies back pain.  Skin/Breast:  Denies any breast pain, lumps, or discharge.   Neurological:  denies headaches, extremity weakness or numbness.  Psychiatric: denies depression or anxiety.  Endocrine:   denies excessive thirst or urination.  Heme/Lymph:  denies history of anemia, easy bruising or bleeding.      PHYSICAL EXAM:   Constitutional: well developed, well nourished  Head/Face: normocephalic  Neck/Thyroid: thyroid symmetric, no thyromegaly, no nodules, no adenopathy  Lymphatic:no abnormal supraclavicular or axillary adenopathy is noted  Breast: normal without palpable masses, tenderness, asymmetry, nipple discharge, nipple retraction or skin changes  Abdomen:  soft, nontender, nondistended, no masses  Skin/Hair: no unusual rashes or bruises  Extremities: no edema, no cyanosis  Psychiatric:  Oriented to time, place, person and situation. Appropriate mood and affect    Pelvic Exam:  External Genitalia: normal appearance, hair distribution, and no lesions  Urethral Meatus:  normal in size, location, without lesions and prolapse  Bladder:  No fullness, masses or tenderness  Vagina:  Normal appearance without lesions, no abnormal discharge  Cervix:  Normal without tenderness on motion  Uterus: normal in size, contour, position, mobility, without tenderness  Adnexa: normal without masses or tenderness  Perineum: normal  Anus: no hemorroids     Assessment & Plan:  Diagnoses and all orders for this visit:    Encounter for well woman exam with  routine gynecological exam  -     Hemoglobin A1C; Future    Irregular menses  -     Cancel: Urine Preg Test [41335]  -     CBC With Differential With Platelet; Future  -     Comp Metabolic Panel (14); Future  -     Lipid Panel; Future  -     Assay, Thyroid Stim Hormone; Future  -     Vitamin D, 25-Hydroxy; Future    Cervical cancer screening  -     Hpv High Risk , Thin Prep Collect; Future  -     ThinPrep PAP Smear B; Future  -     Image-Guided Pap Smear ([facility])    Breast cancer screening by mammogram  -     [facility] ISREAL 2D+3D SCREENING BILAT (CPT=77067/79587); Future    BRCA2 positive  -     [facility] ISREAL 2D+3D SCREENING BILAT (CPT=77067/22127); Future  -     MRI BREAST SCREENING (W+WO) W/CAD BILAT (CPT=77049); Future                   [1] No Known Allergies

## 2025-01-31 ENCOUNTER — TELEPHONE (OUTPATIENT)
Facility: CLINIC | Age: 45
End: 2025-01-31

## 2025-01-31 NOTE — TELEPHONE ENCOUNTER
Spoke with patient and explained to her our surgery scheduler has been out of the office. Will route for attention on Monday. Understanding verbalized.

## 2025-01-31 NOTE — TELEPHONE ENCOUNTER
Patient calling upset as she was told she needs to have a surgery but still hasn't been contacted, she said it also took a long time for her to hear about her results prior to this, requesting to speak with someone asap to schedule.

## 2025-02-03 ENCOUNTER — TELEPHONE (OUTPATIENT)
Facility: CLINIC | Age: 45
End: 2025-02-03

## 2025-02-03 DIAGNOSIS — Z15.01 BRCA2 POSITIVE: ICD-10-CM

## 2025-02-03 DIAGNOSIS — N92.6 IRREGULAR MENSES: Primary | ICD-10-CM

## 2025-02-03 DIAGNOSIS — Z15.09 BRCA2 POSITIVE: ICD-10-CM

## 2025-02-19 ENCOUNTER — LAB REQUISITION (OUTPATIENT)
Dept: LAB | Age: 45
End: 2025-02-19

## 2025-02-19 DIAGNOSIS — Z15.01 GENETIC SUSCEPTIBILITY TO MALIGNANT NEOPLASM OF BREAST: ICD-10-CM

## 2025-02-19 DIAGNOSIS — N93.9 ABNORMAL UTERINE AND VAGINAL BLEEDING, UNSPECIFIED: ICD-10-CM

## 2025-02-19 PROCEDURE — 88305 TISSUE EXAM BY PATHOLOGIST: CPT | Performed by: CLINICAL MEDICAL LABORATORY

## 2025-02-25 ENCOUNTER — TELEPHONE (OUTPATIENT)
Facility: CLINIC | Age: 45
End: 2025-02-25

## 2025-02-25 LAB
ASR DISCLAIMER: NORMAL
CASE RPRT: NORMAL
CLINICAL INFO: NORMAL
PATH REPORT.FINAL DX SPEC: NORMAL
PATH REPORT.GROSS SPEC: NORMAL

## 2025-02-26 ENCOUNTER — TELEPHONE (OUTPATIENT)
Dept: FAMILY MEDICINE CLINIC | Facility: CLINIC | Age: 45
End: 2025-02-26

## 2025-02-26 NOTE — TELEPHONE ENCOUNTER
Per PCP to  reach out to patient to schedule for physical to keep care.    LVM to give us a call back to schedule.

## 2025-02-28 ENCOUNTER — LAB ENCOUNTER (OUTPATIENT)
Dept: LAB | Facility: HOSPITAL | Age: 45
End: 2025-02-28
Attending: OBSTETRICS & GYNECOLOGY
Payer: COMMERCIAL

## 2025-02-28 ENCOUNTER — MED REC SCAN ONLY (OUTPATIENT)
Dept: FAMILY MEDICINE CLINIC | Facility: CLINIC | Age: 45
End: 2025-02-28

## 2025-02-28 DIAGNOSIS — N93.9 ABNORMAL UTERINE BLEEDING: Primary | ICD-10-CM

## 2025-02-28 DIAGNOSIS — Z85.41 HISTORY OF CERVICAL CANCER: ICD-10-CM

## 2025-02-28 DIAGNOSIS — Z15.01 GENETIC SUSCEPTIBILITY TO MALIGNANT NEOPLASM OF BREAST: ICD-10-CM

## 2025-02-28 LAB
BUN BLD-MCNC: 9 MG/DL (ref 9–23)
CANCER AG125 SERPL-ACNC: 15 U/ML (ref ?–30.2)
CREAT BLD-MCNC: 0.77 MG/DL
EGFRCR SERPLBLD CKD-EPI 2021: 96 ML/MIN/1.73M2 (ref 60–?)

## 2025-02-28 PROCEDURE — 36415 COLL VENOUS BLD VENIPUNCTURE: CPT

## 2025-02-28 PROCEDURE — 82565 ASSAY OF CREATININE: CPT

## 2025-02-28 PROCEDURE — 84520 ASSAY OF UREA NITROGEN: CPT

## 2025-02-28 PROCEDURE — 86304 IMMUNOASSAY TUMOR CA 125: CPT

## 2025-03-04 ENCOUNTER — TELEPHONE (OUTPATIENT)
Facility: CLINIC | Age: 45
End: 2025-03-04

## 2025-03-04 DIAGNOSIS — Z15.09 BRCA2 POSITIVE: ICD-10-CM

## 2025-03-04 DIAGNOSIS — Z15.01 BRCA2 POSITIVE: ICD-10-CM

## 2025-03-04 DIAGNOSIS — N92.6 IRREGULAR MENSTRUAL CYCLE: Primary | ICD-10-CM

## 2025-03-25 ENCOUNTER — HOSPITAL ENCOUNTER (OUTPATIENT)
Dept: MRI IMAGING | Facility: HOSPITAL | Age: 45
Discharge: HOME | End: 2025-03-25
Attending: OBSTETRICS & GYNECOLOGY
Payer: COMMERCIAL

## 2025-03-25 DIAGNOSIS — Z15.09 BRCA2 GENE MUTATION POSITIVE: ICD-10-CM

## 2025-03-25 DIAGNOSIS — N93.9 ABNORMAL UTERINE BLEEDING: ICD-10-CM

## 2025-03-25 DIAGNOSIS — Z85.41 HISTORY OF CERVICAL CANCER: ICD-10-CM

## 2025-03-25 DIAGNOSIS — Z15.01 BRCA2 GENE MUTATION POSITIVE: ICD-10-CM

## 2025-03-25 PROCEDURE — 72197 MRI PELVIS W/O & W/DYE: CPT | Performed by: OBSTETRICS & GYNECOLOGY

## 2025-03-25 PROCEDURE — A9575 INJ GADOTERATE MEGLUMI 0.1ML: HCPCS | Performed by: OBSTETRICS & GYNECOLOGY

## 2025-03-25 RX ORDER — GADOTERATE MEGLUMINE 376.9 MG/ML
20 INJECTION INTRAVENOUS
Status: COMPLETED | OUTPATIENT
Start: 2025-03-25 | End: 2025-03-25

## 2025-03-25 RX ADMIN — GADOTERATE MEGLUMINE 20 ML: 376.9 INJECTION INTRAVENOUS at 16:41:00

## 2025-04-15 ENCOUNTER — MED REC SCAN ONLY (OUTPATIENT)
Dept: FAMILY MEDICINE CLINIC | Facility: CLINIC | Age: 45
End: 2025-04-15

## 2025-04-28 ENCOUNTER — TELEPHONE (OUTPATIENT)
Facility: CLINIC | Age: 45
End: 2025-04-28

## 2025-08-13 ENCOUNTER — MED REC SCAN ONLY (OUTPATIENT)
Dept: FAMILY MEDICINE CLINIC | Facility: CLINIC | Age: 45
End: 2025-08-13

## (undated) NOTE — MR AVS SNAPSHOT
After Visit Summary   8/10/2021    Calli Quezada    MRN: [id number]           Visit Information     Date & Time  8/10/2021  2:30 PM Provider  Wei Rai DO [facility]  [address]  Dept.  Phone  [phone]      Your V [[id number] [facility]]  8/10/2021 8/10/2022    LIPID PANEL [[id number] CUSTOM]  8/10/2021 (Approximate) 8/10/2022    [facility] ISREAL 2D+3D SCREENING BILAT (CPT=77067/14134) [COMBO CPT(R)]  8/10/2021 (Approximate) 8/10/2022    THINPREP PAP SMEAR B [[id number] CUSTOM]  8/10/20 survey from [employer], please take a few minutes to complete it and provide feedback. We strive to deliver the best patient experience and are looking for ways to make improvements. Your feedback will help us do so.  For more information on [employer], [place] immediate intervention at a hospital emergency room.  Average cost  $2,300*   *Cost varies based on your insurance coverage  For more information about hours, locations or appointment options available at [facility],   visit [email or web address] [employer] or

## (undated) NOTE — MR AVS SNAPSHOT
[facility] [address], [address] [phone]               Thank you for choosing us for your health care visit with Sarah Beaulieu MD.  We are glad to serve you and happy to provide you with this patterson VITAMIN D, 25-HYDROXY    Complete by: May 26, 2017 (Approximate)    Assoc Dx:  Vitamin D deficiency [E55.9]           [facility] [E]    Complete by:   May 26, 2017 (Approximate)    Assoc Dx:  Screening for genitourinary condition [Z13.89]           [employer] [E] requirements for authorization, please wait 5-7 days and then contact your physician's   office. At that time, you will be provided with any authorization numbers or be assured that none are required. You can then schedule your appointment.  Failure to obta Screening for genitourinary condition          Instructions and Information about Your Health    Call [phone] to schedule US thyroid and Mammogram.  Schedule appointment with Dr. Milan Adhikari for breast clinic.   Schedule appointment with Dr. Neto Porter

## (undated) NOTE — MR AVS SNAPSHOT
[facility]  [address], [facility] [address]  [address] [id number]               Thank you for choosing us for your health care visit with Praneeth Stacy DO.   We are glad to serve you and happy to provide you with this Return in about 1 year (around 6/19/2018) for annual.      Scheduling Instructions     Monday June 19, 2017     Imaging:  [facility] ISREAL 2D+3D DIAGNOSTIC [facility]  BILAT (OMD=[id number])    Instructions:   To schedule an appointment for your radiology test please ca

## (undated) NOTE — Clinical Note
Date: 5/26/2017    Patient Name: Yoseph Burgos          To Whom it may concern: This letter has been written at the patient's request. The above patient was seen at the [facility] for office visit. Please excuse her absence from work.